# Patient Record
Sex: FEMALE | Race: ASIAN | NOT HISPANIC OR LATINO
[De-identification: names, ages, dates, MRNs, and addresses within clinical notes are randomized per-mention and may not be internally consistent; named-entity substitution may affect disease eponyms.]

---

## 2018-09-20 PROBLEM — Z00.00 ENCOUNTER FOR PREVENTIVE HEALTH EXAMINATION: Status: ACTIVE | Noted: 2018-09-20

## 2018-09-21 ENCOUNTER — APPOINTMENT (OUTPATIENT)
Dept: ORTHOPEDIC SURGERY | Facility: CLINIC | Age: 63
End: 2018-09-21
Payer: MEDICAID

## 2018-09-21 VITALS — SYSTOLIC BLOOD PRESSURE: 120 MMHG | DIASTOLIC BLOOD PRESSURE: 72 MMHG | HEART RATE: 76 BPM

## 2018-09-21 VITALS — HEIGHT: 62 IN | BODY MASS INDEX: 31.65 KG/M2 | WEIGHT: 172 LBS

## 2018-09-21 DIAGNOSIS — Z78.9 OTHER SPECIFIED HEALTH STATUS: ICD-10-CM

## 2018-09-21 DIAGNOSIS — Z56.0 UNEMPLOYMENT, UNSPECIFIED: ICD-10-CM

## 2018-09-21 DIAGNOSIS — M51.36 OTHER INTERVERTEBRAL DISC DEGENERATION, LUMBAR REGION: ICD-10-CM

## 2018-09-21 PROCEDURE — 99204 OFFICE O/P NEW MOD 45 MIN: CPT

## 2018-09-21 RX ORDER — LOSARTAN POTASSIUM 100 MG/1
100 TABLET, FILM COATED ORAL
Refills: 0 | Status: ACTIVE | COMMUNITY

## 2018-09-21 RX ORDER — TOPIRAMATE 25 MG/1
25 TABLET, FILM COATED ORAL
Refills: 0 | Status: ACTIVE | COMMUNITY

## 2018-09-21 RX ORDER — MELOXICAM 7.5 MG/1
7.5 TABLET ORAL
Refills: 0 | Status: ACTIVE | COMMUNITY

## 2018-09-21 RX ORDER — CALCIUM CARBONATE 500(1250)
500 TABLET ORAL
Refills: 0 | Status: ACTIVE | COMMUNITY

## 2018-09-21 SDOH — ECONOMIC STABILITY - INCOME SECURITY: UNEMPLOYMENT, UNSPECIFIED: Z56.0

## 2020-03-06 ENCOUNTER — EMERGENCY (EMERGENCY)
Facility: HOSPITAL | Age: 65
LOS: 1 days | Discharge: HOME | End: 2020-03-06
Attending: EMERGENCY MEDICINE | Admitting: EMERGENCY MEDICINE
Payer: MEDICARE

## 2020-03-06 VITALS
OXYGEN SATURATION: 98 % | HEART RATE: 66 BPM | RESPIRATION RATE: 20 BRPM | TEMPERATURE: 98 F | SYSTOLIC BLOOD PRESSURE: 143 MMHG | DIASTOLIC BLOOD PRESSURE: 64 MMHG

## 2020-03-06 VITALS
RESPIRATION RATE: 18 BRPM | OXYGEN SATURATION: 100 % | SYSTOLIC BLOOD PRESSURE: 137 MMHG | HEART RATE: 67 BPM | TEMPERATURE: 98 F | DIASTOLIC BLOOD PRESSURE: 78 MMHG

## 2020-03-06 LAB
ALBUMIN SERPL ELPH-MCNC: 4.3 G/DL — SIGNIFICANT CHANGE UP (ref 3.3–5)
ALP SERPL-CCNC: 65 U/L — SIGNIFICANT CHANGE UP (ref 40–120)
ALT FLD-CCNC: 38 U/L — HIGH (ref 4–33)
ANION GAP SERPL CALC-SCNC: 12 MMO/L — SIGNIFICANT CHANGE UP (ref 7–14)
AST SERPL-CCNC: 37 U/L — HIGH (ref 4–32)
BASOPHILS # BLD AUTO: 0.06 K/UL — SIGNIFICANT CHANGE UP (ref 0–0.2)
BASOPHILS NFR BLD AUTO: 0.7 % — SIGNIFICANT CHANGE UP (ref 0–2)
BILIRUB SERPL-MCNC: 0.3 MG/DL — SIGNIFICANT CHANGE UP (ref 0.2–1.2)
BUN SERPL-MCNC: 14 MG/DL — SIGNIFICANT CHANGE UP (ref 7–23)
CALCIUM SERPL-MCNC: 9.7 MG/DL — SIGNIFICANT CHANGE UP (ref 8.4–10.5)
CHLORIDE SERPL-SCNC: 102 MMOL/L — SIGNIFICANT CHANGE UP (ref 98–107)
CO2 SERPL-SCNC: 23 MMOL/L — SIGNIFICANT CHANGE UP (ref 22–31)
CREAT SERPL-MCNC: 0.64 MG/DL — SIGNIFICANT CHANGE UP (ref 0.5–1.3)
EOSINOPHIL # BLD AUTO: 0.32 K/UL — SIGNIFICANT CHANGE UP (ref 0–0.5)
EOSINOPHIL NFR BLD AUTO: 3.6 % — SIGNIFICANT CHANGE UP (ref 0–6)
GLUCOSE SERPL-MCNC: 96 MG/DL — SIGNIFICANT CHANGE UP (ref 70–99)
HCT VFR BLD CALC: 45.1 % — HIGH (ref 34.5–45)
HGB BLD-MCNC: 14.3 G/DL — SIGNIFICANT CHANGE UP (ref 11.5–15.5)
IMM GRANULOCYTES NFR BLD AUTO: 0.2 % — SIGNIFICANT CHANGE UP (ref 0–1.5)
LYMPHOCYTES # BLD AUTO: 3.3 K/UL — SIGNIFICANT CHANGE UP (ref 1–3.3)
LYMPHOCYTES # BLD AUTO: 37.2 % — SIGNIFICANT CHANGE UP (ref 13–44)
MAGNESIUM SERPL-MCNC: 2.4 MG/DL — SIGNIFICANT CHANGE UP (ref 1.6–2.6)
MCHC RBC-ENTMCNC: 29.7 PG — SIGNIFICANT CHANGE UP (ref 27–34)
MCHC RBC-ENTMCNC: 31.7 % — LOW (ref 32–36)
MCV RBC AUTO: 93.6 FL — SIGNIFICANT CHANGE UP (ref 80–100)
MONOCYTES # BLD AUTO: 0.73 K/UL — SIGNIFICANT CHANGE UP (ref 0–0.9)
MONOCYTES NFR BLD AUTO: 8.2 % — SIGNIFICANT CHANGE UP (ref 2–14)
NEUTROPHILS # BLD AUTO: 4.45 K/UL — SIGNIFICANT CHANGE UP (ref 1.8–7.4)
NEUTROPHILS NFR BLD AUTO: 50.1 % — SIGNIFICANT CHANGE UP (ref 43–77)
NRBC # FLD: 0 K/UL — SIGNIFICANT CHANGE UP (ref 0–0)
PLATELET # BLD AUTO: 344 K/UL — SIGNIFICANT CHANGE UP (ref 150–400)
PMV BLD: 10.8 FL — SIGNIFICANT CHANGE UP (ref 7–13)
POTASSIUM SERPL-MCNC: 5.4 MMOL/L — HIGH (ref 3.5–5.3)
POTASSIUM SERPL-SCNC: 5.4 MMOL/L — HIGH (ref 3.5–5.3)
PROT SERPL-MCNC: 7.5 G/DL — SIGNIFICANT CHANGE UP (ref 6–8.3)
RBC # BLD: 4.82 M/UL — SIGNIFICANT CHANGE UP (ref 3.8–5.2)
RBC # FLD: 14.4 % — SIGNIFICANT CHANGE UP (ref 10.3–14.5)
SODIUM SERPL-SCNC: 137 MMOL/L — SIGNIFICANT CHANGE UP (ref 135–145)
WBC # BLD: 8.88 K/UL — SIGNIFICANT CHANGE UP (ref 3.8–10.5)
WBC # FLD AUTO: 8.88 K/UL — SIGNIFICANT CHANGE UP (ref 3.8–10.5)

## 2020-03-06 PROCEDURE — 99053 MED SERV 10PM-8AM 24 HR FAC: CPT

## 2020-03-06 PROCEDURE — 99283 EMERGENCY DEPT VISIT LOW MDM: CPT

## 2020-03-06 RX ORDER — SODIUM CHLORIDE 9 MG/ML
1000 INJECTION INTRAMUSCULAR; INTRAVENOUS; SUBCUTANEOUS ONCE
Refills: 0 | Status: COMPLETED | OUTPATIENT
Start: 2020-03-06 | End: 2020-03-06

## 2020-03-06 RX ORDER — MECLIZINE HCL 12.5 MG
25 TABLET ORAL ONCE
Refills: 0 | Status: COMPLETED | OUTPATIENT
Start: 2020-03-06 | End: 2020-03-06

## 2020-03-06 RX ORDER — ACETAMINOPHEN 500 MG
650 TABLET ORAL ONCE
Refills: 0 | Status: COMPLETED | OUTPATIENT
Start: 2020-03-06 | End: 2020-03-06

## 2020-03-06 RX ADMIN — SODIUM CHLORIDE 1000 MILLILITER(S): 9 INJECTION INTRAMUSCULAR; INTRAVENOUS; SUBCUTANEOUS at 23:04

## 2020-03-06 RX ADMIN — Medication 650 MILLIGRAM(S): at 23:52

## 2020-03-06 RX ADMIN — Medication 25 MILLIGRAM(S): at 23:04

## 2020-03-06 NOTE — ED PROVIDER NOTE - CLINICAL SUMMARY MEDICAL DECISION MAKING FREE TEXT BOX
64F presenting with dizziness. previous h/o vertigo. symptoms worse with movement. non-focal neuro exam. low concern for CVA. likely benign vertigo. plan for labs, symptom control. will reassess.

## 2020-03-06 NOTE — ED ADULT NURSE REASSESSMENT NOTE - NS ED NURSE REASSESS COMMENT FT1
received report from radha CHANDLER. Pt is a/o x 3. pt states dizziness has improved.  no complaints of chest pain, headache, nausea, vomiting, SOB, fever, chills   verbalized. Pt has 20g iv placed to right AC with no redness or swelling noted. Awaiting further orders. Will continue to monitor.

## 2020-03-06 NOTE — ED PROVIDER NOTE - NSFOLLOWUPINSTRUCTIONS_ED_ALL_ED_FT
You were seen in the emergency department for dizziness.     Please follow-up with your primary care doctor in the next 24-48 hours.     Please follow-up with your ENT doctor in the next 1-2 weeks.     If you have any worsening symptoms, severe headache, changes in vision, slurring of speech or weakness or numbness of one side of your body, please return to the emergency department.

## 2020-03-06 NOTE — ED PROVIDER NOTE - ATTENDING CONTRIBUTION TO CARE
64F, pmh of htn, hld, presenting with dizziness. patient reports two days of intermittent dizziness (room spinning) worse with movement and turning her head. had similar symptoms previously and was seen by ENT. has some difficulty walking but no weakness or numbness on one side of her body. denies fever, chest pain, difficulty breathing, nausea, vomiting, abdominal pain, pain or swelling of lower extremities, pain or burning with urination. On exam: VSS lungs, heart, pulses, abdomen, extremities, skin, all normal on exam. Neuro Cranial nerves normal, EOMI, hearing normal, motor normal sensation normal, FTN and HTS normal, gait slightly unsteady as patient became vertigenous on standing. IMP: Positional vertigo. Plan: Trial of 64F, pmh of htn, hld, presenting with dizziness. patient reports two days of intermittent dizziness (room spinning) worse with movement and turning her head. had similar symptoms previously and was seen by ENT. has some difficulty walking but no weakness or numbness on one side of her body. denies fever, chest pain, difficulty breathing, nausea, vomiting, abdominal pain, pain or swelling of lower extremities, pain or burning with urination. Mild headache. On exam: VSS lungs, heart, pulses, abdomen, extremities, skin, all normal on exam. Neuro Cranial nerves normal, EOMI, hearing normal, motor normal sensation normal, FTN and HTS normal, gait slightly unsteady as patient became vertigenous on standing. IMP: Positional vertigo. Plan: Trial of meclizine check labs reassess

## 2020-03-06 NOTE — ED PROVIDER NOTE - PHYSICAL EXAMINATION
General: uncomfortable appearing female, no acute distress   HEENT: normocephalic, atraumatic, EOMI   Respiratory: normal work of breathing, lungs clear to auscultation bilaterally   Cardiac: regular rate and rhythm   Abdomen: soft, non-tender, no guarding or rebound   MSK: no swelling or tenderness of lower extremities, moving all extremities spontaneously   Skin: warm, dry   Neuro: A&Ox3, cranial nerves II-XII intact, normal finger to nose, normal heel to shin, normal alternating hands, no pronator drift, hesitant gait   Psych: appropriate affect

## 2020-03-06 NOTE — ED PROVIDER NOTE - PROGRESS NOTE DETAILS
patient reports symptoms improved but still present. will give additional meclizine. - resident Rivera Teague updated patient and family on results. patient reports she feels better and is ready for discharge. - resident Rivera Teague

## 2020-03-06 NOTE — ED PROVIDER NOTE - OBJECTIVE STATEMENT
64F, pmh of htn, hld, presenting with dizziness. patient reports two days of intermittent dizziness (room spinning) worse with movement and turning her head. had similar symptoms previously and was seen by ENT. has some difficulty walking but no weakness or numbness on one side of her body. denies fever, chest pain, difficulty breathing, nausea, vomiting, abdominal pain, pain or swelling of lower extremities, pain or burning with urination.

## 2020-03-06 NOTE — ED ADULT NURSE NOTE - OBJECTIVE STATEMENT
Received pt to spot 23 A&Ox4 ambulatory c/o dizziness intermittently since wednesday, states the room is spinning around her. Denies N/V, CP, blurry vision, headache. No acute distress noted. IV placed, labs sent, VS as documented, will continue to monitor.

## 2020-03-06 NOTE — ED PROVIDER NOTE - PATIENT PORTAL LINK FT
You can access the FollowMyHealth Patient Portal offered by [facility] by registering at the following website: [email or web address]. By joining [facility]’s FollowMyHealth portal, you will also be able to view your health information using other applications (apps) compatible with our system.

## 2020-03-06 NOTE — ED ADULT TRIAGE NOTE - CHIEF COMPLAINT QUOTE
"I started to have dizziness for a whole day on Wednesday. I still have dizziness on and off til today. Feels like the room is spinning. no nausea no vomiting" "I started to have dizziness for a whole day on Wednesday. I still have dizziness on and off til today. Feels like the room is spinning. no nausea no vomiting" pt ambulating with steady gait no disequilibrium

## 2020-03-06 NOTE — ED ADULT NURSE NOTE - CHIEF COMPLAINT QUOTE
"I started to have dizziness for a whole day on Wednesday. I still have dizziness on and off til today. Feels like the room is spinning. no nausea no vomiting" pt ambulating with steady gait no disequilibrium

## 2020-03-07 RX ORDER — MECLIZINE HCL 12.5 MG
25 TABLET ORAL ONCE
Refills: 0 | Status: COMPLETED | OUTPATIENT
Start: 2020-03-07 | End: 2020-03-07

## 2020-03-07 RX ORDER — MECLIZINE HCL 12.5 MG
1 TABLET ORAL
Qty: 8 | Refills: 0
Start: 2020-03-07 | End: 2020-03-08

## 2020-03-07 RX ADMIN — Medication 25 MILLIGRAM(S): at 00:43

## 2022-02-23 PROBLEM — E78.5 HYPERLIPIDEMIA, UNSPECIFIED: Chronic | Status: ACTIVE | Noted: 2020-03-06

## 2022-02-23 PROBLEM — I10 ESSENTIAL (PRIMARY) HYPERTENSION: Chronic | Status: ACTIVE | Noted: 2020-03-06

## 2022-07-25 ENCOUNTER — APPOINTMENT (OUTPATIENT)
Dept: PULMONOLOGY | Facility: CLINIC | Age: 67
End: 2022-07-25

## 2022-07-25 VITALS
BODY MASS INDEX: 32.43 KG/M2 | DIASTOLIC BLOOD PRESSURE: 67 MMHG | SYSTOLIC BLOOD PRESSURE: 117 MMHG | TEMPERATURE: 97.9 F | HEIGHT: 61.42 IN | HEART RATE: 77 BPM | OXYGEN SATURATION: 97 % | WEIGHT: 174 LBS

## 2022-07-25 DIAGNOSIS — R07.89 OTHER CHEST PAIN: ICD-10-CM

## 2022-07-25 DIAGNOSIS — R07.9 CHEST PAIN, UNSPECIFIED: ICD-10-CM

## 2022-07-25 PROCEDURE — 99203 OFFICE O/P NEW LOW 30 MIN: CPT

## 2022-07-25 RX ORDER — CHROMIUM 200 MCG
TABLET ORAL
Refills: 0 | Status: ACTIVE | COMMUNITY

## 2022-07-25 RX ORDER — MULTIVITAMIN
TABLET ORAL
Refills: 0 | Status: ACTIVE | COMMUNITY

## 2022-07-25 RX ORDER — PRAVASTATIN SODIUM 20 MG/1
20 TABLET ORAL
Refills: 0 | Status: DISCONTINUED | COMMUNITY
End: 2022-07-25

## 2022-07-25 RX ORDER — AMOXICILLIN 500 MG/1
500 CAPSULE ORAL
Refills: 0 | Status: DISCONTINUED | COMMUNITY
End: 2022-07-25

## 2022-07-25 RX ORDER — ASPIRIN 81 MG
81 TABLET, DELAYED RELEASE (ENTERIC COATED) ORAL
Refills: 0 | Status: ACTIVE | COMMUNITY

## 2022-07-25 RX ORDER — ATORVASTATIN CALCIUM 80 MG/1
80 TABLET, FILM COATED ORAL
Qty: 90 | Refills: 0 | Status: ACTIVE | COMMUNITY
Start: 2022-05-11

## 2022-07-25 NOTE — DISCUSSION/SUMMARY
[FreeTextEntry1] : The patient has left sided shoulder and lower neck pain and supra scapular area pain related to movement for more than 2 years.  The pain is not related to respiration.  It is likely that she has musculoskeletal issues around this area.  This was discussed with her.  She was recommended to try relaxation techniques and yoga to help improvement in movement and thus the pain.  She was advised to take NSAIDs if the pain is severe.\par Will do chest x-ray.\par The patient has clinical features consistent with sleep apnea.  Will do a home sleep study.

## 2022-07-25 NOTE — HISTORY OF PRESENT ILLNESS
[Never] : never [TextBox_4] : The patient is a 66-year-old lady with history of left upper back chest wall pain for the last several years.  She has no history of trauma.  She states that when she takes medications and physical therapy the pain improves.  But the improvement is short-lived.  She is a non-smoker.  There is no occupational history.  Her weight has been stable over the last 4 years.  The pain does not change with breathing.  However she has pain when she moves her left shoulder.  Extreme abduction and abduction of the shoulder causes significant pain.  She has pain in the left supra scapular area as well.  She states that the pain is worse when she wakes up in the morning.  She had x-rays of the area which were normal.\par She had history of osteoarthritis of the lumbar spine for which she was evaluated about 4 years back.  There is no history of dyspnea or cough.\par Her sleep is very poor.  She goes to bed at around 9 PM and wakes up at 11 PM and she cannot fall asleep till about 3 or 4 in the morning.  She sleeps about 4 to 5 hours a night.  She wakes up often with a dry mouth.  She does not know if she snores.  There is no history of witnessed apnea.  There is no history of excessive daytime somnolence.\par She has history of hypercholesterolemia and hypertension.\par There is no history of alcohol use.  She does not exercise much.  She walks daily.

## 2022-08-02 ENCOUNTER — APPOINTMENT (OUTPATIENT)
Dept: PULMONOLOGY | Facility: CLINIC | Age: 67
End: 2022-08-02

## 2022-08-02 PROCEDURE — 95806 SLEEP STUDY UNATT&RESP EFFT: CPT

## 2022-08-08 ENCOUNTER — OUTPATIENT (OUTPATIENT)
Dept: OUTPATIENT SERVICES | Facility: HOSPITAL | Age: 67
LOS: 1 days | End: 2022-08-08
Payer: MEDICARE

## 2022-08-08 ENCOUNTER — APPOINTMENT (OUTPATIENT)
Dept: RADIOLOGY | Facility: IMAGING CENTER | Age: 67
End: 2022-08-08

## 2022-08-08 DIAGNOSIS — R07.89 OTHER CHEST PAIN: ICD-10-CM

## 2022-08-08 PROCEDURE — 71046 X-RAY EXAM CHEST 2 VIEWS: CPT

## 2022-08-08 PROCEDURE — 71046 X-RAY EXAM CHEST 2 VIEWS: CPT | Mod: 26

## 2022-08-09 ENCOUNTER — NON-APPOINTMENT (OUTPATIENT)
Age: 67
End: 2022-08-09

## 2022-09-19 ENCOUNTER — APPOINTMENT (OUTPATIENT)
Dept: PULMONOLOGY | Facility: CLINIC | Age: 67
End: 2022-09-19

## 2022-09-19 VITALS
HEART RATE: 76 BPM | BODY MASS INDEX: 32.43 KG/M2 | HEIGHT: 61.42 IN | DIASTOLIC BLOOD PRESSURE: 83 MMHG | WEIGHT: 174 LBS | OXYGEN SATURATION: 99 % | SYSTOLIC BLOOD PRESSURE: 117 MMHG

## 2022-09-19 DIAGNOSIS — G47.33 OBSTRUCTIVE SLEEP APNEA (ADULT) (PEDIATRIC): ICD-10-CM

## 2022-09-19 PROCEDURE — 99213 OFFICE O/P EST LOW 20 MIN: CPT

## 2022-12-20 NOTE — DISCUSSION/SUMMARY
[FreeTextEntry1] : Patient who has diagnosed sleep apnea is waiting to get her PAP machine.  Details of the need for PAP machine was discussed with the patient.  She will return after she starts using the PAP machine.  She will continue to exercise and diet.

## 2022-12-20 NOTE — HISTORY OF PRESENT ILLNESS
[Never] : never [TextBox_4] : Patient with history of sleep apnea returns for follow-up.  Her body ache has improved with her exercises.  Since the last visit sleep apnea was diagnosed and PAP was ordered.  Her chest x-ray was done which was negative.\par The patient is waiting to get her PAP machine.  She has been exercising and walking daily.\par \par 7/25/22\par The patient is a 66-year-old lady with history of left upper back chest wall pain for the last several years.  She has no history of trauma.  She states that when she takes medications and physical therapy the pain improves.  But the improvement is short-lived.  She is a non-smoker.  There is no occupational history.  Her weight has been stable over the last 4 years.  The pain does not change with breathing.  However she has pain when she moves her left shoulder.  Extreme abduction and abduction of the shoulder causes significant pain.  She has pain in the left supra scapular area as well.  She states that the pain is worse when she wakes up in the morning.  She had x-rays of the area which were normal.\par She had history of osteoarthritis of the lumbar spine for which she was evaluated about 4 years back.  There is no history of dyspnea or cough.\par Her sleep is very poor.  She goes to bed at around 9 PM and wakes up at 11 PM and she cannot fall asleep till about 3 or 4 in the morning.  She sleeps about 4 to 5 hours a night.  She wakes up often with a dry mouth.  She does not know if she snores.  There is no history of witnessed apnea.  There is no history of excessive daytime somnolence.\par She has history of hypercholesterolemia and hypertension.\par There is no history of alcohol use.  She does not exercise much.  She walks daily.

## 2022-12-29 ENCOUNTER — APPOINTMENT (OUTPATIENT)
Dept: PULMONOLOGY | Facility: CLINIC | Age: 67
End: 2022-12-29

## 2023-07-27 ENCOUNTER — APPOINTMENT (OUTPATIENT)
Dept: ORTHOPEDIC SURGERY | Facility: CLINIC | Age: 68
End: 2023-07-27
Payer: MEDICARE

## 2023-07-27 VITALS — HEIGHT: 61 IN | WEIGHT: 180 LBS | BODY MASS INDEX: 33.99 KG/M2

## 2023-07-27 DIAGNOSIS — M25.561 PAIN IN RIGHT KNEE: ICD-10-CM

## 2023-07-27 DIAGNOSIS — M25.562 PAIN IN RIGHT KNEE: ICD-10-CM

## 2023-07-27 DIAGNOSIS — G89.29 PAIN IN RIGHT KNEE: ICD-10-CM

## 2023-07-27 PROCEDURE — 73562 X-RAY EXAM OF KNEE 3: CPT | Mod: 50

## 2023-07-27 PROCEDURE — 99205 OFFICE O/P NEW HI 60 MIN: CPT

## 2023-08-02 ENCOUNTER — OUTPATIENT (OUTPATIENT)
Dept: OUTPATIENT SERVICES | Facility: HOSPITAL | Age: 68
LOS: 1 days | End: 2023-08-02
Payer: MEDICARE

## 2023-08-02 ENCOUNTER — APPOINTMENT (OUTPATIENT)
Dept: CT IMAGING | Facility: IMAGING CENTER | Age: 68
End: 2023-08-02
Payer: MEDICARE

## 2023-08-02 DIAGNOSIS — M17.12 UNILATERAL PRIMARY OSTEOARTHRITIS, LEFT KNEE: ICD-10-CM

## 2023-08-02 PROCEDURE — 73700 CT LOWER EXTREMITY W/O DYE: CPT | Mod: 26,LT

## 2023-08-02 PROCEDURE — 73700 CT LOWER EXTREMITY W/O DYE: CPT

## 2023-08-08 ENCOUNTER — APPOINTMENT (OUTPATIENT)
Dept: ORTHOPEDIC SURGERY | Facility: CLINIC | Age: 68
End: 2023-08-08
Payer: MEDICARE

## 2023-08-08 VITALS — HEIGHT: 62 IN | WEIGHT: 180 LBS | BODY MASS INDEX: 33.13 KG/M2

## 2023-08-08 DIAGNOSIS — M17.0 BILATERAL PRIMARY OSTEOARTHRITIS OF KNEE: ICD-10-CM

## 2023-08-08 DIAGNOSIS — M17.12 UNILATERAL PRIMARY OSTEOARTHRITIS, LEFT KNEE: ICD-10-CM

## 2023-08-08 DIAGNOSIS — M25.551 PAIN IN RIGHT HIP: ICD-10-CM

## 2023-08-08 PROCEDURE — 99215 OFFICE O/P EST HI 40 MIN: CPT

## 2023-08-11 PROBLEM — M17.12 PRIMARY LOCALIZED OSTEOARTHRITIS OF LEFT KNEE: Status: ACTIVE | Noted: 2023-07-27

## 2023-08-11 PROBLEM — M17.0 PRIMARY LOCALIZED OSTEOARTHRITIS OF BOTH KNEES: Status: ACTIVE | Noted: 2023-07-27

## 2023-08-15 ENCOUNTER — TRANSCRIPTION ENCOUNTER (OUTPATIENT)
Age: 68
End: 2023-08-15

## 2023-08-24 ENCOUNTER — APPOINTMENT (OUTPATIENT)
Dept: OBGYN | Facility: CLINIC | Age: 68
End: 2023-08-24
Payer: MEDICARE

## 2023-08-24 VITALS
WEIGHT: 187 LBS | DIASTOLIC BLOOD PRESSURE: 69 MMHG | BODY MASS INDEX: 34.41 KG/M2 | SYSTOLIC BLOOD PRESSURE: 135 MMHG | HEIGHT: 62 IN

## 2023-08-24 DIAGNOSIS — Z80.0 FAMILY HISTORY OF MALIGNANT NEOPLASM OF DIGESTIVE ORGANS: ICD-10-CM

## 2023-08-24 DIAGNOSIS — Z01.419 ENCOUNTER FOR GYNECOLOGICAL EXAMINATION (GENERAL) (ROUTINE) W/OUT ABNORMAL FINDINGS: ICD-10-CM

## 2023-08-24 DIAGNOSIS — Z82.49 FAMILY HISTORY OF ISCHEMIC HEART DISEASE AND OTHER DISEASES OF THE CIRCULATORY SYSTEM: ICD-10-CM

## 2023-08-24 DIAGNOSIS — Z87.51 PERSONAL HISTORY OF PRE-TERM LABOR: ICD-10-CM

## 2023-08-24 PROCEDURE — 99387 INIT PM E/M NEW PAT 65+ YRS: CPT

## 2023-08-24 NOTE — PLAN
[FreeTextEntry1] : 67 year  y/o P8 presenting for annual exam -f/u pap, not sexually active since 2010 no GC/CT sent  -mammogram done 05/2023 wnl as per patient - colonoscopy wnl within the year  -DEXA 2 years ago WNL  -Contraception: none -f/u PRN

## 2023-08-24 NOTE — HISTORY OF PRESENT ILLNESS
[postmenopausal] : postmenopausal [N] : Patient is not sexually active [Y] : Positive pregnancy history [Previously active] : previously active [Men] : men [Vaginal] : vaginal [No] : No [Mammogramdate] : 05/2023 [TextBox_19] : WNL [TextBox_37] : 2 YEARS AGO RACHAEL  [ColonoscopyDate] : 02/2023 [TextBox_43] : WNL AS PER PATIENT [PGxTotal] : 8 [PGxPremature] : 8 [PGHxAbortions] : 0 [[facility] Hospital[facility]Living] : 0 [FreeTextEntry1] : NSVDx8 all were born at around 34 weeks

## 2023-08-24 NOTE — PHYSICAL EXAM
[Chaperone Present] : A chaperone was present in the examining room during all aspects of the physical examination [Appropriately responsive] : appropriately responsive [Alert] : alert [No Acute Distress] : no acute distress [No Lymphadenopathy] : no lymphadenopathy [Soft] : soft [Non-tender] : non-tender [Non-distended] : non-distended [No HSM] : No HSM [No Lesions] : no lesions [No Mass] : no mass [Oriented x3] : oriented x3 [Examination Of The Breasts] : a normal appearance [No Masses] : no breast masses were palpable [Labia Majora] : normal [Labia Minora] : normal [Normal] : normal [Uterine Adnexae] : normal [FreeTextEntry1] : [place]  [Dry Mucosa] : dry mucosa

## 2023-08-26 LAB
HPV 16 E6+E7 MRNA CVX QL NAA+PROBE: NOT DETECTED
HPV HIGH+LOW RISK DNA PNL CVX: NOT DETECTED
HPV18+45 E6+E7 MRNA CVX QL NAA+PROBE: NOT DETECTED

## 2023-08-28 ENCOUNTER — OUTPATIENT (OUTPATIENT)
Dept: OUTPATIENT SERVICES | Facility: HOSPITAL | Age: 68
LOS: 1 days | End: 2023-08-28
Payer: MEDICARE

## 2023-08-28 VITALS
OXYGEN SATURATION: 97 % | SYSTOLIC BLOOD PRESSURE: 121 MMHG | HEART RATE: 79 BPM | TEMPERATURE: 98 F | DIASTOLIC BLOOD PRESSURE: 64 MMHG | HEIGHT: 61.5 IN | RESPIRATION RATE: 15 BRPM | WEIGHT: 179.9 LBS

## 2023-08-28 DIAGNOSIS — M17.12 UNILATERAL PRIMARY OSTEOARTHRITIS, LEFT KNEE: ICD-10-CM

## 2023-08-28 DIAGNOSIS — M17.11 UNILATERAL PRIMARY OSTEOARTHRITIS, RIGHT KNEE: ICD-10-CM

## 2023-08-28 DIAGNOSIS — I10 ESSENTIAL (PRIMARY) HYPERTENSION: ICD-10-CM

## 2023-08-28 DIAGNOSIS — G47.33 OBSTRUCTIVE SLEEP APNEA (ADULT) (PEDIATRIC): ICD-10-CM

## 2023-08-28 DIAGNOSIS — Z92.241 PERSONAL HISTORY OF SYSTEMIC STEROID THERAPY: Chronic | ICD-10-CM

## 2023-08-28 DIAGNOSIS — Z98.890 OTHER SPECIFIED POSTPROCEDURAL STATES: Chronic | ICD-10-CM

## 2023-08-28 DIAGNOSIS — Z98.49 CATARACT EXTRACTION STATUS, UNSPECIFIED EYE: Chronic | ICD-10-CM

## 2023-08-28 LAB
A1C WITH ESTIMATED AVERAGE GLUCOSE RESULT: 6.2 % — HIGH (ref 4–5.6)
ANION GAP SERPL CALC-SCNC: 12 MMOL/L — SIGNIFICANT CHANGE UP (ref 7–14)
APPEARANCE UR: CLEAR — SIGNIFICANT CHANGE UP
BACTERIA # UR AUTO: NEGATIVE /HPF — SIGNIFICANT CHANGE UP
BILIRUB UR-MCNC: NEGATIVE — SIGNIFICANT CHANGE UP
BLD GP AB SCN SERPL QL: NEGATIVE — SIGNIFICANT CHANGE UP
BUN SERPL-MCNC: 10 MG/DL — SIGNIFICANT CHANGE UP (ref 7–23)
CALCIUM SERPL-MCNC: 9.5 MG/DL — SIGNIFICANT CHANGE UP (ref 8.4–10.5)
CAST: 0 /LPF — SIGNIFICANT CHANGE UP (ref 0–4)
CHLORIDE SERPL-SCNC: 103 MMOL/L — SIGNIFICANT CHANGE UP (ref 98–107)
CO2 SERPL-SCNC: 26 MMOL/L — SIGNIFICANT CHANGE UP (ref 22–31)
COLOR SPEC: YELLOW — SIGNIFICANT CHANGE UP
CREAT SERPL-MCNC: 0.61 MG/DL — SIGNIFICANT CHANGE UP (ref 0.5–1.3)
DIFF PNL FLD: NEGATIVE — SIGNIFICANT CHANGE UP
EGFR: 97 ML/MIN/1.73M2 — SIGNIFICANT CHANGE UP
ESTIMATED AVERAGE GLUCOSE: 131 — SIGNIFICANT CHANGE UP
GLUCOSE SERPL-MCNC: 101 MG/DL — HIGH (ref 70–99)
GLUCOSE UR QL: NEGATIVE MG/DL — SIGNIFICANT CHANGE UP
HCT VFR BLD CALC: 46.9 % — HIGH (ref 34.5–45)
HGB BLD-MCNC: 14.7 G/DL — SIGNIFICANT CHANGE UP (ref 11.5–15.5)
KETONES UR-MCNC: NEGATIVE MG/DL — SIGNIFICANT CHANGE UP
LEUKOCYTE ESTERASE UR-ACNC: ABNORMAL
MCHC RBC-ENTMCNC: 29.5 PG — SIGNIFICANT CHANGE UP (ref 27–34)
MCHC RBC-ENTMCNC: 31.3 GM/DL — LOW (ref 32–36)
MCV RBC AUTO: 94.2 FL — SIGNIFICANT CHANGE UP (ref 80–100)
NITRITE UR-MCNC: NEGATIVE — SIGNIFICANT CHANGE UP
NRBC # BLD: 0 /100 WBCS — SIGNIFICANT CHANGE UP (ref 0–0)
NRBC # FLD: 0 K/UL — SIGNIFICANT CHANGE UP (ref 0–0)
PH UR: 6 — SIGNIFICANT CHANGE UP (ref 5–8)
PLATELET # BLD AUTO: 332 K/UL — SIGNIFICANT CHANGE UP (ref 150–400)
POTASSIUM SERPL-MCNC: 3.7 MMOL/L — SIGNIFICANT CHANGE UP (ref 3.5–5.3)
POTASSIUM SERPL-SCNC: 3.7 MMOL/L — SIGNIFICANT CHANGE UP (ref 3.5–5.3)
PROT UR-MCNC: NEGATIVE MG/DL — SIGNIFICANT CHANGE UP
RBC # BLD: 4.98 M/UL — SIGNIFICANT CHANGE UP (ref 3.8–5.2)
RBC # FLD: 14.6 % — HIGH (ref 10.3–14.5)
RBC CASTS # UR COMP ASSIST: 0 /HPF — SIGNIFICANT CHANGE UP (ref 0–4)
RH IG SCN BLD-IMP: POSITIVE — SIGNIFICANT CHANGE UP
SODIUM SERPL-SCNC: 141 MMOL/L — SIGNIFICANT CHANGE UP (ref 135–145)
SP GR SPEC: 1.01 — SIGNIFICANT CHANGE UP (ref 1–1.03)
SQUAMOUS # UR AUTO: 0 /HPF — SIGNIFICANT CHANGE UP (ref 0–5)
UROBILINOGEN FLD QL: 0.2 MG/DL — SIGNIFICANT CHANGE UP (ref 0.2–1)
WBC # BLD: 7.78 K/UL — SIGNIFICANT CHANGE UP (ref 3.8–10.5)
WBC # FLD AUTO: 7.78 K/UL — SIGNIFICANT CHANGE UP (ref 3.8–10.5)
WBC UR QL: 1 /HPF — SIGNIFICANT CHANGE UP (ref 0–5)

## 2023-08-28 PROCEDURE — 93010 ELECTROCARDIOGRAM REPORT: CPT

## 2023-08-28 NOTE — H&P PST ADULT - HISTORY OF PRESENT ILLNESS
67 year old female with PMH of HTN, HLD,presents to PST, with pre op diagnosis of unilateral primary osteoarthritis left knee, for pre op evaluation prior to scheduled surgery- left total  knee replacement with JUAREZ Robot assist with Dr Casas.

## 2023-08-28 NOTE — H&P PST ADULT - NSICDXPASTMEDICALHX_GEN_ALL_CORE_FT
PAST MEDICAL HISTORY:  Dizziness     HLD (hyperlipidemia)     HTN (hypertension)     Osteoarthritis

## 2023-08-28 NOTE — H&P PST ADULT - FUNCTIONAL STATUS
can walk 1 blocks, home chores, can climb 1 flight of stairs- METs 4, patient has pain on b/l knees which  restricts to do exercises/4-10 METS

## 2023-08-28 NOTE — H&P PST ADULT - ADDITIONAL PE
denies loose teeth, Patient has partial upper dentures  soft palate isn't visible- class 4 Mallampati

## 2023-08-28 NOTE — H&P PST ADULT - REASON FOR ADMISSION
patient reports she is going for  left knee replacement ( PAF reports patient is scheduled for right total knee replacement.)

## 2023-08-28 NOTE — H&P PST ADULT - PROBLEM SELECTOR PLAN 1
Patient is tentatively scheduled surgery- left total  knee replacement with [employer] Robot assist with Dr Casas on 09/13/23.    Pre-op instructions provided. Pt given verbal and written instructions with teach back on chlorhexidine wash and patient's own omeprazole. Pt verbalized understanding with return demonstration.    CBC, BMP, A1C, T&S, UA, Urine culture, MRSA sent  Patient with MARV, HTN, HLD, going for high risk surgery- patient has appointment with pcp on 09/01/23 for pre op medical evaluation- will obtain copy of it. Patient is tentatively scheduled surgery- left total  knee replacement with IROCKE Robot assist with Dr Casas on 09/13/23.- Spoke to Michell surgical coordinator of Dr Casas- regarding the error in laterality of surgical site on Booking request report form.( documented as right knee replacement).     Pre-op instructions provided. Pt given verbal and written instructions with teach back on chlorhexidine wash and patient's own omeprazole. Pt verbalized understanding with return demonstration.    CBC, BMP, A1C, T&S, UA, Urine culture, MRSA sent  Patient with MARV, HTN, HLD, going for high risk surgery- patient has appointment with pcp on 09/01/23 for pre op medical evaluation- will obtain copy of it.

## 2023-08-28 NOTE — H&P PST ADULT - PROBLEM SELECTOR PLAN 2
Patient instructed to take losartan with a sip of water on the morning of procedure.   EKG in chart  Awaiting ECHO and stress test results

## 2023-08-28 NOTE — H&P PST ADULT - NSICDXPASTSURGICALHX_GEN_ALL_CORE_FT
PAST SURGICAL HISTORY:  H/O arthroscopy of knee     S/P cataract surgery     S/P epidural steroid injection

## 2023-08-29 ENCOUNTER — NON-APPOINTMENT (OUTPATIENT)
Age: 68
End: 2023-08-29

## 2023-08-29 LAB
CULTURE RESULTS: SIGNIFICANT CHANGE UP
CYTOLOGY CVX/VAG DOC THIN PREP: NORMAL
MRSA PCR RESULT.: SIGNIFICANT CHANGE UP
S AUREUS DNA NOSE QL NAA+PROBE: SIGNIFICANT CHANGE UP
SPECIMEN SOURCE: SIGNIFICANT CHANGE UP

## 2023-09-06 ENCOUNTER — APPOINTMENT (OUTPATIENT)
Dept: CT IMAGING | Facility: IMAGING CENTER | Age: 68
End: 2023-09-06

## 2023-09-06 PROBLEM — M19.90 UNSPECIFIED OSTEOARTHRITIS, UNSPECIFIED SITE: Chronic | Status: ACTIVE | Noted: 2023-08-28

## 2023-09-06 PROBLEM — R42 DIZZINESS AND GIDDINESS: Chronic | Status: ACTIVE | Noted: 2023-08-28

## 2023-09-07 ENCOUNTER — OUTPATIENT (OUTPATIENT)
Dept: OUTPATIENT SERVICES | Facility: HOSPITAL | Age: 68
LOS: 1 days | End: 2023-09-07
Payer: MEDICARE

## 2023-09-07 ENCOUNTER — APPOINTMENT (OUTPATIENT)
Dept: CT IMAGING | Facility: IMAGING CENTER | Age: 68
End: 2023-09-07
Payer: MEDICARE

## 2023-09-07 ENCOUNTER — RESULT REVIEW (OUTPATIENT)
Age: 68
End: 2023-09-07

## 2023-09-07 DIAGNOSIS — Z00.8 ENCOUNTER FOR OTHER GENERAL EXAMINATION: ICD-10-CM

## 2023-09-07 DIAGNOSIS — M17.11 UNILATERAL PRIMARY OSTEOARTHRITIS, RIGHT KNEE: ICD-10-CM

## 2023-09-07 DIAGNOSIS — Z98.49 CATARACT EXTRACTION STATUS, UNSPECIFIED EYE: Chronic | ICD-10-CM

## 2023-09-07 PROCEDURE — 73700 CT LOWER EXTREMITY W/O DYE: CPT | Mod: 26,RT

## 2023-09-07 PROCEDURE — 73700 CT LOWER EXTREMITY W/O DYE: CPT

## 2023-09-12 ENCOUNTER — NON-APPOINTMENT (OUTPATIENT)
Age: 68
End: 2023-09-12

## 2023-09-12 ENCOUNTER — TRANSCRIPTION ENCOUNTER (OUTPATIENT)
Age: 68
End: 2023-09-12

## 2023-09-12 NOTE — ASU PATIENT PROFILE, ADULT - FALL HARM RISK - HARM RISK INTERVENTIONS

## 2023-09-12 NOTE — ASU PATIENT PROFILE, ADULT - FALL HARM RISK - FALL HARM RISK
Admission Reconciliation is Completed  Discharge Reconciliation is Not Complete Admission Reconciliation is Completed  Discharge Reconciliation is Completed Surgery

## 2023-09-13 ENCOUNTER — INPATIENT (INPATIENT)
Facility: HOSPITAL | Age: 68
LOS: 0 days | Discharge: HOME HEALTH | End: 2023-09-14
Attending: ORTHOPAEDIC SURGERY | Admitting: ORTHOPAEDIC SURGERY
Payer: MEDICARE

## 2023-09-13 ENCOUNTER — TRANSCRIPTION ENCOUNTER (OUTPATIENT)
Age: 68
End: 2023-09-13

## 2023-09-13 ENCOUNTER — RESULT REVIEW (OUTPATIENT)
Age: 68
End: 2023-09-13

## 2023-09-13 ENCOUNTER — APPOINTMENT (OUTPATIENT)
Dept: ORTHOPEDIC SURGERY | Facility: HOSPITAL | Age: 68
End: 2023-09-13

## 2023-09-13 VITALS
SYSTOLIC BLOOD PRESSURE: 138 MMHG | TEMPERATURE: 98 F | HEIGHT: 61.5 IN | HEART RATE: 76 BPM | OXYGEN SATURATION: 97 % | WEIGHT: 179.9 LBS | RESPIRATION RATE: 16 BRPM | DIASTOLIC BLOOD PRESSURE: 77 MMHG

## 2023-09-13 DIAGNOSIS — Z98.49 CATARACT EXTRACTION STATUS, UNSPECIFIED EYE: Chronic | ICD-10-CM

## 2023-09-13 DIAGNOSIS — Z98.890 OTHER SPECIFIED POSTPROCEDURAL STATES: Chronic | ICD-10-CM

## 2023-09-13 DIAGNOSIS — M17.11 UNILATERAL PRIMARY OSTEOARTHRITIS, RIGHT KNEE: ICD-10-CM

## 2023-09-13 DIAGNOSIS — Z92.241 PERSONAL HISTORY OF SYSTEMIC STEROID THERAPY: Chronic | ICD-10-CM

## 2023-09-13 LAB
GLUCOSE BLDC GLUCOMTR-MCNC: 137 MG/DL — HIGH (ref 70–99)
GLUCOSE BLDC GLUCOMTR-MCNC: 140 MG/DL — HIGH (ref 70–99)
GLUCOSE BLDC GLUCOMTR-MCNC: 99 MG/DL — SIGNIFICANT CHANGE UP (ref 70–99)

## 2023-09-13 PROCEDURE — 27447 TOTAL KNEE ARTHROPLASTY: CPT | Mod: LT

## 2023-09-13 PROCEDURE — 0055T BONE SRGRY CMPTR CT/MRI IMAG: CPT

## 2023-09-13 PROCEDURE — 73560 X-RAY EXAM OF KNEE 1 OR 2: CPT | Mod: 26,LT

## 2023-09-13 PROCEDURE — S2900 ROBOTIC SURGICAL SYSTEM: CPT | Mod: NC

## 2023-09-13 PROCEDURE — 20985 CPTR-ASST DIR MS PX: CPT

## 2023-09-13 DEVICE — MAKO BONE PIN 4MM X 140MM: Type: IMPLANTABLE DEVICE | Site: LEFT | Status: FUNCTIONAL

## 2023-09-13 DEVICE — INSERT TIB BEARING CS X3 SZ 4 9MM: Type: IMPLANTABLE DEVICE | Site: LEFT | Status: FUNCTIONAL

## 2023-09-13 DEVICE — MAKO BONE PIN 4MM X 110MM: Type: IMPLANTABLE DEVICE | Site: LEFT | Status: FUNCTIONAL

## 2023-09-13 DEVICE — COMP FEM CR CMNTLSS BEADED W/ PA SZ 4 LT: Type: IMPLANTABLE DEVICE | Site: LEFT | Status: FUNCTIONAL

## 2023-09-13 DEVICE — BASEPLATE TIB TRIATHLON TRITAN SZ 4: Type: IMPLANTABLE DEVICE | Site: LEFT | Status: FUNCTIONAL

## 2023-09-13 DEVICE — PATELLA ASYMMETRIC TRIATHLON 29MM: Type: IMPLANTABLE DEVICE | Site: LEFT | Status: FUNCTIONAL

## 2023-09-13 RX ORDER — LOSARTAN POTASSIUM 100 MG/1
1 TABLET, FILM COATED ORAL
Refills: 0 | DISCHARGE

## 2023-09-13 RX ORDER — KETOROLAC TROMETHAMINE 30 MG/ML
15 SYRINGE (ML) INJECTION EVERY 6 HOURS
Refills: 0 | Status: DISCONTINUED | OUTPATIENT
Start: 2023-09-13 | End: 2023-09-14

## 2023-09-13 RX ORDER — OXYCODONE HYDROCHLORIDE 5 MG/1
10 TABLET ORAL
Refills: 0 | Status: DISCONTINUED | OUTPATIENT
Start: 2023-09-13 | End: 2023-09-14

## 2023-09-13 RX ORDER — MECLIZINE HCL 12.5 MG
25 TABLET ORAL
Refills: 0 | Status: DISCONTINUED | OUTPATIENT
Start: 2023-09-13 | End: 2023-09-14

## 2023-09-13 RX ORDER — TRAMADOL HYDROCHLORIDE 50 MG/1
50 TABLET ORAL EVERY 6 HOURS
Refills: 0 | Status: DISCONTINUED | OUTPATIENT
Start: 2023-09-13 | End: 2023-09-14

## 2023-09-13 RX ORDER — ACETAMINOPHEN 500 MG
975 TABLET ORAL EVERY 8 HOURS
Refills: 0 | Status: DISCONTINUED | OUTPATIENT
Start: 2023-09-13 | End: 2023-09-14

## 2023-09-13 RX ORDER — CEFAZOLIN SODIUM 1 G
2000 VIAL (EA) INJECTION EVERY 8 HOURS
Refills: 0 | Status: COMPLETED | OUTPATIENT
Start: 2023-09-13 | End: 2023-09-14

## 2023-09-13 RX ORDER — PANTOPRAZOLE SODIUM 20 MG/1
40 TABLET, DELAYED RELEASE ORAL ONCE
Refills: 0 | Status: COMPLETED | OUTPATIENT
Start: 2023-09-13 | End: 2023-09-13

## 2023-09-13 RX ORDER — ASPIRIN/CALCIUM CARB/MAGNESIUM 324 MG
325 TABLET ORAL DAILY
Refills: 0 | Status: DISCONTINUED | OUTPATIENT
Start: 2023-09-13 | End: 2023-09-14

## 2023-09-13 RX ORDER — SODIUM CHLORIDE 9 MG/ML
1000 INJECTION, SOLUTION INTRAVENOUS
Refills: 0 | Status: DISCONTINUED | OUTPATIENT
Start: 2023-09-13 | End: 2023-09-14

## 2023-09-13 RX ORDER — MAGNESIUM HYDROXIDE 400 MG/1
30 TABLET, CHEWABLE ORAL DAILY
Refills: 0 | Status: DISCONTINUED | OUTPATIENT
Start: 2023-09-13 | End: 2023-09-14

## 2023-09-13 RX ORDER — OXYCODONE HYDROCHLORIDE 5 MG/1
5 TABLET ORAL ONCE
Refills: 0 | Status: DISCONTINUED | OUTPATIENT
Start: 2023-09-13 | End: 2023-09-13

## 2023-09-13 RX ORDER — TRAMADOL HYDROCHLORIDE 50 MG/1
50 TABLET ORAL ONCE
Refills: 0 | Status: DISCONTINUED | OUTPATIENT
Start: 2023-09-13 | End: 2023-09-13

## 2023-09-13 RX ORDER — ONDANSETRON 8 MG/1
4 TABLET, FILM COATED ORAL EVERY 6 HOURS
Refills: 0 | Status: DISCONTINUED | OUTPATIENT
Start: 2023-09-13 | End: 2023-09-14

## 2023-09-13 RX ORDER — CHLORHEXIDINE GLUCONATE 213 G/1000ML
1 SOLUTION TOPICAL ONCE
Refills: 0 | Status: COMPLETED | OUTPATIENT
Start: 2023-09-13 | End: 2023-09-13

## 2023-09-13 RX ORDER — CELECOXIB 200 MG/1
200 CAPSULE ORAL EVERY 12 HOURS
Refills: 0 | Status: DISCONTINUED | OUTPATIENT
Start: 2023-09-14 | End: 2023-09-14

## 2023-09-13 RX ORDER — CYCLOSPORINE 0.5 MG/ML
1 EMULSION OPHTHALMIC
Refills: 0 | DISCHARGE

## 2023-09-13 RX ORDER — SODIUM CHLORIDE 9 MG/ML
500 INJECTION, SOLUTION INTRAVENOUS ONCE
Refills: 0 | Status: COMPLETED | OUTPATIENT
Start: 2023-09-13 | End: 2023-09-13

## 2023-09-13 RX ORDER — HYDROMORPHONE HYDROCHLORIDE 2 MG/ML
1 INJECTION INTRAMUSCULAR; INTRAVENOUS; SUBCUTANEOUS
Refills: 0 | Status: DISCONTINUED | OUTPATIENT
Start: 2023-09-13 | End: 2023-09-13

## 2023-09-13 RX ORDER — DEXAMETHASONE 0.5 MG/5ML
8 ELIXIR ORAL ONCE
Refills: 0 | Status: COMPLETED | OUTPATIENT
Start: 2023-09-14 | End: 2023-09-14

## 2023-09-13 RX ORDER — POLYETHYLENE GLYCOL 3350 17 G/17G
17 POWDER, FOR SOLUTION ORAL AT BEDTIME
Refills: 0 | Status: DISCONTINUED | OUTPATIENT
Start: 2023-09-13 | End: 2023-09-14

## 2023-09-13 RX ORDER — SENNA PLUS 8.6 MG/1
2 TABLET ORAL AT BEDTIME
Refills: 0 | Status: DISCONTINUED | OUTPATIENT
Start: 2023-09-13 | End: 2023-09-14

## 2023-09-13 RX ORDER — SODIUM CHLORIDE 9 MG/ML
500 INJECTION, SOLUTION INTRAVENOUS ONCE
Refills: 0 | Status: COMPLETED | OUTPATIENT
Start: 2023-09-13 | End: 2023-09-14

## 2023-09-13 RX ORDER — HYDROCHLOROTHIAZIDE 25 MG
1 TABLET ORAL
Refills: 0 | DISCHARGE

## 2023-09-13 RX ORDER — MECLIZINE HCL 12.5 MG
1 TABLET ORAL
Refills: 0 | DISCHARGE

## 2023-09-13 RX ORDER — FOLIC ACID 0.8 MG
1 TABLET ORAL
Refills: 0 | DISCHARGE

## 2023-09-13 RX ORDER — HYDROMORPHONE HYDROCHLORIDE 2 MG/ML
0.5 INJECTION INTRAMUSCULAR; INTRAVENOUS; SUBCUTANEOUS ONCE
Refills: 0 | Status: DISCONTINUED | OUTPATIENT
Start: 2023-09-13 | End: 2023-09-14

## 2023-09-13 RX ORDER — OXYCODONE HYDROCHLORIDE 5 MG/1
5 TABLET ORAL
Refills: 0 | Status: DISCONTINUED | OUTPATIENT
Start: 2023-09-13 | End: 2023-09-14

## 2023-09-13 RX ORDER — HYDROMORPHONE HYDROCHLORIDE 2 MG/ML
0.5 INJECTION INTRAMUSCULAR; INTRAVENOUS; SUBCUTANEOUS
Refills: 0 | Status: DISCONTINUED | OUTPATIENT
Start: 2023-09-13 | End: 2023-09-13

## 2023-09-13 RX ORDER — LOSARTAN POTASSIUM 100 MG/1
100 TABLET, FILM COATED ORAL DAILY
Refills: 0 | Status: DISCONTINUED | OUTPATIENT
Start: 2023-09-13 | End: 2023-09-14

## 2023-09-13 RX ORDER — ACETAMINOPHEN 500 MG
1000 TABLET ORAL ONCE
Refills: 0 | Status: COMPLETED | OUTPATIENT
Start: 2023-09-13 | End: 2023-09-13

## 2023-09-13 RX ORDER — ONDANSETRON 8 MG/1
4 TABLET, FILM COATED ORAL ONCE
Refills: 0 | Status: DISCONTINUED | OUTPATIENT
Start: 2023-09-13 | End: 2023-09-13

## 2023-09-13 RX ORDER — ASCORBIC ACID 60 MG
1 TABLET,CHEWABLE ORAL
Refills: 0 | DISCHARGE

## 2023-09-13 RX ORDER — ACETAMINOPHEN 500 MG
1000 TABLET ORAL ONCE
Refills: 0 | Status: COMPLETED | OUTPATIENT
Start: 2023-09-14 | End: 2023-09-14

## 2023-09-13 RX ADMIN — Medication 15 MILLIGRAM(S): at 21:25

## 2023-09-13 RX ADMIN — OXYCODONE HYDROCHLORIDE 5 MILLIGRAM(S): 5 TABLET ORAL at 17:00

## 2023-09-13 RX ADMIN — PANTOPRAZOLE SODIUM 40 MILLIGRAM(S): 20 TABLET, DELAYED RELEASE ORAL at 11:22

## 2023-09-13 RX ADMIN — Medication 100 MILLIGRAM(S): at 22:00

## 2023-09-13 RX ADMIN — SODIUM CHLORIDE 30 MILLILITER(S): 9 INJECTION, SOLUTION INTRAVENOUS at 11:20

## 2023-09-13 RX ADMIN — CHLORHEXIDINE GLUCONATE 1 APPLICATION(S): 213 SOLUTION TOPICAL at 11:19

## 2023-09-13 RX ADMIN — POLYETHYLENE GLYCOL 3350 17 GRAM(S): 17 POWDER, FOR SOLUTION ORAL at 21:26

## 2023-09-13 RX ADMIN — Medication 25 MILLIGRAM(S): at 21:26

## 2023-09-13 RX ADMIN — OXYCODONE HYDROCHLORIDE 5 MILLIGRAM(S): 5 TABLET ORAL at 16:07

## 2023-09-13 RX ADMIN — TRAMADOL HYDROCHLORIDE 50 MILLIGRAM(S): 50 TABLET ORAL at 11:22

## 2023-09-13 RX ADMIN — SODIUM CHLORIDE 500 MILLILITER(S): 9 INJECTION, SOLUTION INTRAVENOUS at 15:57

## 2023-09-13 RX ADMIN — Medication 975 MILLIGRAM(S): at 21:26

## 2023-09-13 NOTE — DISCHARGE NOTE PROVIDER - NSDCMRMEDTOKEN_GEN_ALL_CORE_FT
Artificial Tears ophthalmic solution: 1 in each eye 2 times a day  aspirin 81 mg oral tablet: 1 orally once a day (in the morning)  calcium 600 1 cap oral daily:   folic acid 1 mg oral tablet: 1 orally once a day  hydroCHLOROthiazide 12.5 mg oral tablet: 1 orally once a day (in the morning)  losartan 100 mg oral tablet: 1 orally once a day (in the morning)  meclizine 25 mg oral tablet: 1 orally as needed for vertigo  omeprazole 40 mg oral delayed release capsule: 1 orally once a day  Refresh Optive Ganesh-3 ophthalmic solution: 1 in each eye 2 times a day  Restasis 0.05% ophthalmic emulsion: 1 in each affected eye 2 times a day PRN  Tab-A-ar 1 tab oral daily last dose 8/28/23:   Vitamin C 500 mg oral capsule: 1 orally once a day  Vitamin D3 2000 iu daily:    acetaminophen 325 mg oral tablet: 3 tab(s) orally every 8 hours MDD: 9  Artificial Tears ophthalmic solution: 1 in each eye 2 times a day  aspirin 325 mg oral delayed release tablet: 1 tab(s) orally once a day MDD: 2  calcium 600 1 cap oral daily:   folic acid 1 mg oral tablet: 1 orally once a day  hydroCHLOROthiazide 12.5 mg oral tablet: 1 orally once a day (in the morning)  losartan 100 mg oral tablet: 1 orally once a day (in the morning)  meclizine 25 mg oral tablet: 1 orally as needed for vertigo  Narcan 4 mg/0.1 mL nasal spray: 4 milligram(s) intranasally once MDD: 1  oxyCODONE 5 mg oral tablet: 1 tab(s) orally every 3 hours as needed for Moderate Pain (4 - 6) MDD: 2  Protonix 40 mg oral delayed release tablet: 1 tab(s) orally once a day MDD: 1  Refresh Optive Ganesh-3 ophthalmic solution: 1 in each eye 2 times a day  Restasis 0.05% ophthalmic emulsion: 1 in each affected eye 2 times a day PRN  senna leaf extract oral tablet: 2 tab(s) orally once a day (at bedtime) MDD: 2  Tab-A-ar 1 tab oral daily last dose 8/28/23:   traMADol 50 mg oral tablet: 1 tab(s) orally every 8 hours as needed for Mild Pain (1 - 3) MDD: 3  Vitamin C 500 mg oral capsule: 1 orally once a day  Vitamin D3 2000 iu daily:

## 2023-09-13 NOTE — PHYSICAL THERAPY INITIAL EVALUATION ADULT - ACTIVE RANGE OF MOTION EXAMINATION, REHAB EVAL
except left knee active ROM 0-60 degrees/bilateral upper extremity Active ROM was WFL (within functional limits)/bilateral  lower extremity Active ROM was WFL (within functional limits)

## 2023-09-13 NOTE — DISCHARGE NOTE PROVIDER - NSDCFUSCHEDAPPT_GEN_ALL_CORE_FT
Dominguez Casas  [facility]  ORTHOSURG [address] [address] [facility]  Scheduled Appointment: 09/27/2023

## 2023-09-13 NOTE — OCCUPATIONAL THERAPY INITIAL EVALUATION ADULT - PERTINENT HX OF CURRENT PROBLEM, REHAB EVAL
67 year old female with PMH of HTN, HLD ,presents to PST, with pre op diagnosis of unilateral primary osteoarthritis left knee, for pre op evaluation prior to scheduled surgery left total  knee replacement with JUAREZ Robot on 9/13/23,

## 2023-09-13 NOTE — PHYSICAL THERAPY INITIAL EVALUATION ADULT - ADDITIONAL COMMENTS
Pt. reports owning a straight cane, rolling walker, and shower chair.    Pt. was left in bed post PT Evaluation, no apparent distress, all lines intact, call lamb within reach. Sheryl CHANDLER made aware of pt. status.

## 2023-09-13 NOTE — OCCUPATIONAL THERAPY INITIAL EVALUATION ADULT - NSOTDISCHREC_GEN_A_CORE
To be determined. Patient dizzy during evaluation and unable to participate in functional activities.

## 2023-09-13 NOTE — PATIENT PROFILE ADULT - FALL HARM RISK - RISK INTERVENTIONS
Assistance OOB with selected safe patient handling equipment/Assistance with ambulation/Communicate Fall Risk and Risk Factors to all staff, patient, and family/Discuss with provider need for PT consult/Monitor gait and stability/Provide patient with walking aids - walker, cane, crutches/Reinforce activity limits and safety measures with patient and family/Sit up slowly, dangle for a short time, stand at bedside before walking/Use of alarms - bed, chair and/or voice tab/Visual Cue: Yellow wristband/Bed in lowest position, wheels locked, appropriate side rails in place/Call bell, personal items and telephone in reach/Instruct patient to call for assistance before getting out of bed or chair/Non-slip footwear when patient is out of bed/[place] to call system/Physically safe environment - no spills, clutter or unnecessary equipment/Purposeful Proactive Rounding/Room/bathroom lighting operational, light cord in reach

## 2023-09-13 NOTE — DISCHARGE NOTE PROVIDER - CARE PROVIDERS DIRECT ADDRESSES
[email or web address]

## 2023-09-13 NOTE — BRIEF OPERATIVE NOTE - NSICDXBRIEFPREOP_GEN_ALL_CORE_FT
PRE-OP DIAGNOSIS:  Osteoarthritis of left knee 13-Sep-2023 18:07:04  Kate Ng  
PRE-OP DIAGNOSIS:  Osteoarthritis of right knee 13-Sep-2023 17:41:58  Kate Ng

## 2023-09-13 NOTE — OCCUPATIONAL THERAPY INITIAL EVALUATION ADULT - ADDITIONAL COMMENTS
Patient lives in a private home with 5 steps to enter. Her bedroom is in the basement and there are 9 steps. She lives with her family and has a walk in shower with a shower chair. She was independent in all ADLs. She has a walker and cane at home, but did not use them yet.

## 2023-09-13 NOTE — PHYSICAL THERAPY INITIAL EVALUATION ADULT - REFERRING PHYSICIAN, REHAB EVAL
Alisha Romano called to request a refill on her medication. Last office visit : 5/22/2023   Next office visit : 8/23/2023     Last UDS:   Amphetamine Screen, Urine   Date Value Ref Range Status   02/20/2023 N  Final     Barbiturate Screen, Urine   Date Value Ref Range Status   02/20/2023 POSITIVE  Final     Benzodiazepine Screen, Urine   Date Value Ref Range Status   02/20/2023 N  Final     Buprenorphine Urine   Date Value Ref Range Status   02/20/2023 N  Final     Cocaine Metabolite Screen, Urine   Date Value Ref Range Status   02/20/2023 N  Final     Gabapentin Screen, Urine   Date Value Ref Range Status   02/20/2023 N  Final     MDMA, Urine   Date Value Ref Range Status   02/20/2023 N  Final     Methamphetamine, Urine   Date Value Ref Range Status   02/20/2023 N  Final     Opiate Scrn, Ur   Date Value Ref Range Status   02/20/2023 N  Final     Oxycodone Screen, Ur   Date Value Ref Range Status   02/20/2023 N  Final     PCP Screen, Urine   Date Value Ref Range Status   02/20/2023 N  Final     Propoxyphene Screen, Urine   Date Value Ref Range Status   02/20/2023 N  Final     THC Screen, Urine   Date Value Ref Range Status   02/20/2023 N  Final     Tricyclic Antidepressants, Urine   Date Value Ref Range Status   02/20/2023 N  Final       Last Ankit Diaz: 7-28-23  Medication Contract: 2-20-23   Last Fill: 7-1-23    Requested Prescriptions     Pending Prescriptions Disp Refills    traMADol (ULTRAM) 50 MG tablet [Pharmacy Med Name: traMADol HCl 50 MG Oral Tablet] 45 tablet 0     Sig: TAKE 1 TABLET BY MOUTH EVERY 12 HOURS AS NEEDED FOR PAIN FOR  UP  TO  30  DAYS         Please approve or refuse this medication.    Sarahi Mclaughlin MA Kate Ng

## 2023-09-13 NOTE — OCCUPATIONAL THERAPY INITIAL EVALUATION ADULT - GENERAL OBSERVATIONS, REHAB EVAL
Patient received semisupine in bed in NAD. +IV. /78 mmHg. Patient agreed to participate in OT evalaution.

## 2023-09-13 NOTE — DISCHARGE NOTE PROVIDER - CARE PROVIDER_API CALL
Dominguez Casas  Orthopaedic Surgery  [address], [address]  [place] [address]  Phone: [phone]  Fax: [phone]  Established Patient  Follow Up Time:

## 2023-09-13 NOTE — DISCHARGE NOTE PROVIDER - HOSPITAL COURSE
67 Pt is s/p  left TKA  without any intraoperative complications.  Pt is doing well and stable for discharge.  Pt is tolerating physical therapy: WBAT with cane/walker if needed, gait training.  Leave dressing on until post op office visit (POD#14). Have sutures/staples removed POD#14 if present.  Pt is on enteric coated ASA 325mg PO BID for DVT prophylaxis, take for 6 weeks unless otherwise instructed by surgeon.  follow up with Dr. Casas  in two weeks. Follow up with primary care doctor in 1-2 weeks for continuity of care.  The patient had no post-operative complications and clinically progressed faster than anticipated.   The patient met criteria for discharge before the 2nd night of stay. The patient was safely and appropriately discharged home.

## 2023-09-14 ENCOUNTER — TRANSCRIPTION ENCOUNTER (OUTPATIENT)
Age: 68
End: 2023-09-14

## 2023-09-14 VITALS
DIASTOLIC BLOOD PRESSURE: 57 MMHG | TEMPERATURE: 98 F | OXYGEN SATURATION: 99 % | RESPIRATION RATE: 17 BRPM | SYSTOLIC BLOOD PRESSURE: 123 MMHG | HEART RATE: 70 BPM

## 2023-09-14 DIAGNOSIS — E78.5 HYPERLIPIDEMIA, UNSPECIFIED: ICD-10-CM

## 2023-09-14 DIAGNOSIS — Z86.69 PERSONAL HISTORY OF OTHER DISEASES OF THE NERVOUS SYSTEM AND SENSE ORGANS: ICD-10-CM

## 2023-09-14 DIAGNOSIS — I10 ESSENTIAL (PRIMARY) HYPERTENSION: ICD-10-CM

## 2023-09-14 DIAGNOSIS — M19.90 UNSPECIFIED OSTEOARTHRITIS, UNSPECIFIED SITE: ICD-10-CM

## 2023-09-14 DIAGNOSIS — Z29.9 ENCOUNTER FOR PROPHYLACTIC MEASURES, UNSPECIFIED: ICD-10-CM

## 2023-09-14 LAB
ANION GAP SERPL CALC-SCNC: 15 MMOL/L — HIGH (ref 7–14)
BUN SERPL-MCNC: 10 MG/DL — SIGNIFICANT CHANGE UP (ref 7–23)
CALCIUM SERPL-MCNC: 9.8 MG/DL — SIGNIFICANT CHANGE UP (ref 8.4–10.5)
CHLORIDE SERPL-SCNC: 98 MMOL/L — SIGNIFICANT CHANGE UP (ref 98–107)
CO2 SERPL-SCNC: 24 MMOL/L — SIGNIFICANT CHANGE UP (ref 22–31)
CREAT SERPL-MCNC: 0.62 MG/DL — SIGNIFICANT CHANGE UP (ref 0.5–1.3)
EGFR: 96 ML/MIN/1.73M2 — SIGNIFICANT CHANGE UP
GLUCOSE SERPL-MCNC: 145 MG/DL — HIGH (ref 70–99)
HCT VFR BLD CALC: 43.8 % — SIGNIFICANT CHANGE UP (ref 34.5–45)
HGB BLD-MCNC: 14.1 G/DL — SIGNIFICANT CHANGE UP (ref 11.5–15.5)
MCHC RBC-ENTMCNC: 29.4 PG — SIGNIFICANT CHANGE UP (ref 27–34)
MCHC RBC-ENTMCNC: 32.2 GM/DL — SIGNIFICANT CHANGE UP (ref 32–36)
MCV RBC AUTO: 91.3 FL — SIGNIFICANT CHANGE UP (ref 80–100)
NRBC # BLD: 0 /100 WBCS — SIGNIFICANT CHANGE UP (ref 0–0)
NRBC # FLD: 0 K/UL — SIGNIFICANT CHANGE UP (ref 0–0)
PLATELET # BLD AUTO: 321 K/UL — SIGNIFICANT CHANGE UP (ref 150–400)
POTASSIUM SERPL-MCNC: 4.9 MMOL/L — SIGNIFICANT CHANGE UP (ref 3.5–5.3)
POTASSIUM SERPL-SCNC: 4.9 MMOL/L — SIGNIFICANT CHANGE UP (ref 3.5–5.3)
RBC # BLD: 4.8 M/UL — SIGNIFICANT CHANGE UP (ref 3.8–5.2)
RBC # FLD: 14.7 % — HIGH (ref 10.3–14.5)
SODIUM SERPL-SCNC: 137 MMOL/L — SIGNIFICANT CHANGE UP (ref 135–145)
WBC # BLD: 14.14 K/UL — HIGH (ref 3.8–10.5)
WBC # FLD AUTO: 14.14 K/UL — HIGH (ref 3.8–10.5)

## 2023-09-14 PROCEDURE — 99223 1ST HOSP IP/OBS HIGH 75: CPT

## 2023-09-14 RX ORDER — OXYCODONE HYDROCHLORIDE 5 MG/1
1 TABLET ORAL
Qty: 42 | Refills: 0
Start: 2023-09-14 | End: 2023-09-20

## 2023-09-14 RX ORDER — TRAMADOL HYDROCHLORIDE 50 MG/1
1 TABLET ORAL
Qty: 21 | Refills: 0
Start: 2023-09-14 | End: 2023-09-20

## 2023-09-14 RX ORDER — ASPIRIN/CALCIUM CARB/MAGNESIUM 324 MG
1 TABLET ORAL
Qty: 42 | Refills: 0
Start: 2023-09-14 | End: 2023-10-25

## 2023-09-14 RX ORDER — NALOXONE HYDROCHLORIDE 4 MG/.1ML
4 SPRAY NASAL
Qty: 1 | Refills: 0
Start: 2023-09-14 | End: 2023-09-14

## 2023-09-14 RX ORDER — PANTOPRAZOLE SODIUM 20 MG/1
1 TABLET, DELAYED RELEASE ORAL
Qty: 30 | Refills: 0
Start: 2023-09-14 | End: 2023-10-13

## 2023-09-14 RX ORDER — ACETAMINOPHEN 500 MG
3 TABLET ORAL
Qty: 63 | Refills: 0
Start: 2023-09-14 | End: 2023-09-20

## 2023-09-14 RX ORDER — SENNA PLUS 8.6 MG/1
2 TABLET ORAL
Qty: 28 | Refills: 0
Start: 2023-09-14 | End: 2023-09-27

## 2023-09-14 RX ORDER — OMEPRAZOLE 10 MG/1
1 CAPSULE, DELAYED RELEASE ORAL
Refills: 0 | DISCHARGE

## 2023-09-14 RX ORDER — PANTOPRAZOLE SODIUM 20 MG/1
40 TABLET, DELAYED RELEASE ORAL DAILY
Refills: 0 | Status: DISCONTINUED | OUTPATIENT
Start: 2023-09-14 | End: 2023-09-14

## 2023-09-14 RX ORDER — ASPIRIN/CALCIUM CARB/MAGNESIUM 324 MG
1 TABLET ORAL
Refills: 0 | DISCHARGE

## 2023-09-14 RX ADMIN — Medication 975 MILLIGRAM(S): at 07:02

## 2023-09-14 RX ADMIN — OXYCODONE HYDROCHLORIDE 10 MILLIGRAM(S): 5 TABLET ORAL at 09:15

## 2023-09-14 RX ADMIN — Medication 15 MILLIGRAM(S): at 14:14

## 2023-09-14 RX ADMIN — LOSARTAN POTASSIUM 100 MILLIGRAM(S): 100 TABLET, FILM COATED ORAL at 07:02

## 2023-09-14 RX ADMIN — CELECOXIB 200 MILLIGRAM(S): 200 CAPSULE ORAL at 18:34

## 2023-09-14 RX ADMIN — Medication 101.6 MILLIGRAM(S): at 08:04

## 2023-09-14 RX ADMIN — Medication 15 MILLIGRAM(S): at 14:44

## 2023-09-14 RX ADMIN — OXYCODONE HYDROCHLORIDE 10 MILLIGRAM(S): 5 TABLET ORAL at 02:50

## 2023-09-14 RX ADMIN — Medication 975 MILLIGRAM(S): at 14:13

## 2023-09-14 RX ADMIN — CELECOXIB 200 MILLIGRAM(S): 200 CAPSULE ORAL at 19:34

## 2023-09-14 RX ADMIN — ONDANSETRON 4 MILLIGRAM(S): 8 TABLET, FILM COATED ORAL at 10:32

## 2023-09-14 RX ADMIN — OXYCODONE HYDROCHLORIDE 10 MILLIGRAM(S): 5 TABLET ORAL at 10:45

## 2023-09-14 RX ADMIN — Medication 100 MILLIGRAM(S): at 07:01

## 2023-09-14 RX ADMIN — Medication 15 MILLIGRAM(S): at 08:04

## 2023-09-14 RX ADMIN — Medication 15 MILLIGRAM(S): at 02:52

## 2023-09-14 RX ADMIN — Medication 975 MILLIGRAM(S): at 15:13

## 2023-09-14 RX ADMIN — Medication 25 MILLIGRAM(S): at 09:25

## 2023-09-14 RX ADMIN — Medication 15 MILLIGRAM(S): at 03:45

## 2023-09-14 NOTE — CONSULT NOTE ADULT - SUBJECTIVE AND OBJECTIVE BOX
Medicine Progress Note    Patient is a 67y old  Female who presents with a chief complaint of left Sudarshan TKA  (13 Sep 2023 16:51)      SUBJECTIVE / OVERNIGHT EVENTS: no events     :    MEDICATIONS  (STANDING):  acetaminophen     Tablet .. 975 milliGRAM(s) Oral every 8 hours  aspirin enteric coated 325 milliGRAM(s) Oral daily  celecoxib 200 milliGRAM(s) Oral every 12 hours  hydrochlorothiazide 12.5 milliGRAM(s) Oral daily  HYDROmorphone  Injectable 0.5 milliGRAM(s) IV Push once  lactated ringers. 1000 milliLiter(s) (30 mL/Hr) IV Continuous <Continuous>  losartan 100 milliGRAM(s) Oral daily  polyethylene glycol 3350 17 Gram(s) Oral at bedtime  senna 2 Tablet(s) Oral at bedtime    MEDICATIONS  (PRN):  magnesium hydroxide Suspension 30 milliLiter(s) Oral daily PRN Constipation  meclizine 25 milliGRAM(s) Oral two times a day PRN vertigo  ondansetron Injectable 4 milliGRAM(s) IV Push every 6 hours PRN Nausea and/or Vomiting  oxyCODONE    IR 5 milliGRAM(s) Oral every 3 hours PRN Moderate Pain (4 - 6)  oxyCODONE    IR 10 milliGRAM(s) Oral every 3 hours PRN Severe Pain (7 - 10)  traMADol 50 milliGRAM(s) Oral every 6 hours PRN Mild Pain (1 - 3)    CAPILLARY BLOOD GLUCOSE      POCT Blood Glucose.: 140 mg/dL (13 Sep 2023 22:18)  POCT Blood Glucose.: 137 mg/dL (13 Sep 2023 20:04)    I&O's Summary    13 Sep 2023 07:01  -  14 Sep 2023 07:00  --------------------------------------------------------  IN: 600 mL / OUT: 600 mL / NET: 0 mL    14 Sep 2023 07:01  -  14 Sep 2023 14:25  --------------------------------------------------------  IN: 0 mL / OUT: 500 mL / NET: -500 mL        PHYSICAL EXAM:  Vital Signs Last 24 Hrs  T(C): 36.7 (14 Sep 2023 14:24), Max: 36.7 (14 Sep 2023 14:24)  T(F): 98.1 (14 Sep 2023 14:24), Max: 98.1 (14 Sep 2023 14:24)  HR: 71 (14 Sep 2023 14:24) (61 - 80)  BP: 142/62 (14 Sep 2023 14:24) (130/64 - 166/96)  BP(mean): 105 (13 Sep 2023 17:15) (93 - 118)  RR: 17 (14 Sep 2023 14:24) (10 - 20)  SpO2: 100% (14 Sep 2023 14:24) (96% - 100%)    Parameters below as of 14 Sep 2023 14:24  Patient On (Oxygen Delivery Method): room air      CONSTITUTIONAL: NAD,   ENMT: Moist oral mucosa, no pharyngeal injection or exudates;   RESPIRATORY: Normal respiratory effort; lungs are clear to auscultation bilaterally  CARDIOVASCULAR: Regular rate and rhythm, normal S1 and S2, no murmur/; No lower extremity edema; Left knee brace   ABDOMEN: Nontender to palpation, normoactive bowel sounds, no rebound/guarding;   PSYCH: A+O to person, place, and time; affect appropriate  NEUROLOGY: CN 2-12 are intact and symmetric; no gross sensory deficits   SKIN: No rashes; no palpable lesions    LABS:                        14.1   14.14 )-----------( 321      ( 14 Sep 2023 05:29 )             43.8     09-14    137  |  98  |  10  ----------------------------<  145<H>  4.9   |  24  |  0.62    Ca    9.8      14 Sep 2023 05:29            Urinalysis Basic - ( 14 Sep 2023 05:29 )    Color: x / Appearance: x / SG: x / pH: x  Gluc: 145 mg/dL / Ketone: x  / Bili: x / Urobili: x   Blood: x / Protein: x / Nitrite: x   Leuk Esterase: x / RBC: x / WBC x   Sq Epi: x / Non Sq Epi: x / Bacteria: x            RADIOLOGY & ADDITIONAL TESTS:  Imaging from Last 24 Hours:    Electrocardiogram/QTc Interval:    COORDINATION OF CARE:  Care Discussed with Consultants/Other Providers: Ortho

## 2023-09-14 NOTE — DISCHARGE NOTE NURSING/CASE MANAGEMENT/SOCIAL WORK - PATIENT PORTAL LINK FT
You can access the FollowMyHealth Patient Portal offered by [facility] by registering at the following website: [email or web address]. By joining [facility]’s FollowMyHealth portal, you will also be able to view your health information using other applications (apps) compatible with our system.

## 2023-09-14 NOTE — CONSULT NOTE ADULT - ASSESSMENT
67 year old female with PMH of HTN, HLD,presents to [facility], with pre op diagnosis of unilateral primary osteoarthritis left knee, for pre op evaluation prior to scheduled surgery- left total  knee replacement with JUARE[employer] Robot assist with Dr Casas.  Now Patient is s/p   L TKA DOS 9/13/2023.

## 2023-09-14 NOTE — CONSULT NOTE ADULT - PROBLEM SELECTOR PROBLEM 1
Assessment    1  Acute pharyngitis, unspecified etiology (462) (J02 9)    Discussion/Summary  Discussion Summary:   Rapid strep was negative  We will send a throat culture for further testing  Likely viral  Symptomatic treatment is appropriate  Can use ibuprofen or Tylenol as needed for pain  Use throat lozenges, salt water gargle cell with symptoms  If not improving over the next 5-7 days, follow up with PCP  Follow Up Instructions: Follow Up with your Primary Care Provider in 7 days  If your symptoms worsen, go to the nearest [place] [id number] Emergency Department  Chief Complaint    1  Sore Throat  Chief Complaint Free Text Note Form: c/o sore throat for 2 days, painful swallowing ; concerned if strep  History of Present Illness  HPI: 75-year-old female here with complaint of sore throat for the last 2 days  Has had painful swallowing  She is concerned if she has strep or not  Denies any fever chills  Has had a slight cough  Hospital Based Practices Required Assessment:  Pain Assessment  the patient states they have pain  The pain is located in the throat  (on a scale of 0 to 10, the patient rates the pain at 4 )  Abuse And Domestic Violence Screen   Yes, the patient is safe at home  -- The patient states no one is hurting them  Depression And Suicide Screen  No, the patient has not had thoughts of hurting themself  Sore Throat: Rajiv Conroy presents with complaints of sore throat  Associated symptoms include cough, but-- no nasal congestion,-- no postnasal drainage,-- no swollen glands,-- no myalgias,-- no drooling,-- no stridor,-- no fever,-- no chills,-- no headache,-- no hoarseness,-- no neck stiffness,-- no ear pain,-- no abdominal pain,-- no nausea-- and-- no vomiting  Review of Systems  Focused-Female:  Constitutional: no fever,-- not feeling poorly,-- no chills-- and-- not feeling tired  ENT: sore throat, but-- as noted in HPI,-- no earache-- and-- no nasal discharge  Cardiovascular: no complaints of slow or fast heart rate, no chest pain, no palpitations, no leg claudication or lower extremity edema  Respiratory: cough, but-- as noted in HPI,-- no shortness of breath-- and-- no wheezing  ROS Reviewed:   ROS reviewed  Active Problems    1  Acute pharyngitis, unspecified etiology (462) (J02 9)   2  Anxiety (300 00) (F41 9)   3  Bacterial vaginosis (616 10,041 9) (N76 0,B96 89)   4  Candidiasis (112 9) (B37 9)   5  Contraceptives (V25 02)   6  Cough (786 2) (R05)   7  Dyspareunia (625 0)   8  Eczema (692 9) (L30 9)   9  Encounter for routine pelvic examination (V72 31) (Z01 419)   10  Encounter for screening for malignant neoplasm of cervix (V76 2) (Z12 4)   11  Exposure to STD (V01 6) (Z20 2)   12  Menorrhagia (626 2) (N92 0)   13  Methicillin resistant Staphylococcus aureus infection (041 12) (A49 02)   14  Preeclampsia (642 40) (O14 90)   15  Sore throat (462) (J02 9)   16  Vaginitis (616 10) (N76 0)   17  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)   18  Vulvar Pain (625 8)    Past Medical History  1  History of Blood Transfusion Hemolysis (999 83)   2  History of Chlamydia trachomatis infection of lower genitourinary site (099 53) (A56 09)   3  History Of 1  Previous Pregnancies (V61 5)   4  History of acute sinusitis (V12 69) (Z87 09)   5  History of condyloma acuminatum (V12 09) (Z86 19)   6  History of human papillomavirus infection (V12 09) (Z86 19)  Active Problems And Past Medical History Reviewed: The active problems and past medical history were reviewed and updated today  Family History  Father    1  Family history of Hypertension (V17 49)  Paternal Grandmother    2  Family history of Breast Cancer (V16 3)  Paternal Grandfather    3  Family history of Diabetes Mellitus (V18 0)  Family History Reviewed: The family history was reviewed and updated today         Social History   · Denied: History of Alcohol Use (History)   · Daily Coffee Consumption (1  Cups/Day)   · Denied: History of Drug Use   · Never A Smoker  Social History Reviewed: The social history was reviewed and updated today  The social history was reviewed and is unchanged  Surgical History    1  History of Breast Surgery Enlargement Procedure   2  History of  Section Low Transverse   3  Contraceptives (V25 02)   4  History of Pap Smear (+) Low Grade Squamous Intraepithelial Lesion (795 03)  Surgical History Reviewed: The surgical history was reviewed and updated today  Current Meds   1  Amoxicillin-Pot Clavulanate 500-125 MG Oral Tablet; TAKE 1 TABLET EVERY 12 HOURS DAILY; Therapy: [id number] to (Kalyan Aguilar)  Requested for: [id number]; Last Rx:2015 Ordered  Medication List Reviewed: The medication list was reviewed and updated today  Allergies    1  No Known Drug Allergies    Vitals  Signs   Recorded: [id number] 10:36AM   Temperature: 96 5 F  Heart Rate: 67  Respiration: 18  Systolic: 872  Diastolic: 78  Weight: 607 lb   BMI Calculated: 20 53  BSA Calculated: 1 73  O2 Saturation: 100  Pain Scale: 4    Physical Exam   Constitutional  General appearance: No acute distress, well appearing and well nourished  Ears, Nose, Mouth, and Throat  External inspection of ears and nose: Normal    Otoscopic examination: Tympanic membranes translucent with normal light reflex  Canals patent without erythema  Nasal mucosa, septum, and turbinates: Normal without edema or erythema  Oropharynx: Abnormal   The posterior pharynx was erythematous, but-- did not have an exudate  Pulmonary  Respiratory effort: No increased work of breathing or signs of respiratory distress  Auscultation of lungs: Clear to auscultation  Cardiovascular  Auscultation of heart: Normal rate and rhythm, normal S1 and S2, without murmurs         Results/Data  Rapid [place] Cord- POC [id number] 10:43AM Phoebe Nine     Test Name Result Flag Reference   Rapid Strep Negative           Signatures   Electronically signed by : Chaparro Archibald, [facility]; Dec  1 2017 11:06AM EST                       (Author)    Electronically signed by : RACHNA Young ; Dec  1 2017  6:21PM EST                       (Co-author) Osteoarthritis

## 2023-09-14 NOTE — PROGRESS NOTE ADULT - ASSESSMENT
A/P  Patient S/P L TKA DOS 9/13/2023.  VSS. NAD.  PT/OT-WBAT  IS  DVT PPx: aspirin 325 BID  Pain Control  Dispo planning: home once cleared by PT

## 2023-09-14 NOTE — DISCHARGE NOTE NURSING/CASE MANAGEMENT/SOCIAL WORK - NSDCPEFALRISK_GEN_ALL_CORE
For information on Fall & Injury Prevention, visit: [email or web address] OR  [email or web address] OR  https://www.cdc.gov/steadi/patient.html

## 2023-09-14 NOTE — DISCHARGE NOTE NURSING/CASE MANAGEMENT/SOCIAL WORK - NSSCTYPOFSERV_GEN_ALL_CORE
Nurse and physical therapist anticipated start of care is the day after hospital discharge; the above home care agency will contact you to arrange time of initial visits.

## 2023-09-14 NOTE — DISCHARGE NOTE NURSING/CASE MANAGEMENT/SOCIAL WORK - NSDCPECAREGIVERED_GEN_ALL_CORE
knee replacement  exercise worksheet  caring for my incision  pain management  cold compression therapy

## 2023-09-14 NOTE — PROGRESS NOTE ADULT - SUBJECTIVE AND OBJECTIVE BOX
ORTHO POC      Patient resting comfortably without complaint s/p left TKA today     Vital Signs Last 24 Hrs  T(C): 36.6 (13 Sep 2023 16:30), Max: 36.7 (13 Sep 2023 10:25)  T(F): 97.8 (13 Sep 2023 16:30), Max: 98 (13 Sep 2023 10:25)  HR: 74 (13 Sep 2023 16:45) (67 - 80)  BP: 165/86 (13 Sep 2023 16:45) (130/80 - 166/96)  BP(mean): 110 (13 Sep 2023 16:45) (93 - 118)  RR: 14 (13 Sep 2023 16:45) (10 - 20)  SpO2: 100% (13 Sep 2023 16:45) (96% - 100%)    Parameters below as of 13 Sep 2023 16:00  Patient On (Oxygen Delivery Method): room air        left knee: dressing clean/dry/ intact   Ice wrap in place           LE:  EHL/GC/TA 5/5                  sensory intact                   DP pulse 2+    Labs : AM                U/O:  DTV    A/P: Stable postop            PT- WBAT            DVT prophylaxis- venodynes/ ASA BID           Pain Control            Incentive Spirometer            Antibiotics x 24 hr            Follow up AM labs
ANESTHESIA POSTOP CHECK    67y Female POSTOP DAY 1     Vital Signs Last 24 Hrs  T(C): 36.4 (14 Sep 2023 06:57), Max: 36.7 (13 Sep 2023 10:25)  T(F): 97.6 (14 Sep 2023 06:57), Max: 98 (13 Sep 2023 10:25)  HR: 61 (14 Sep 2023 06:57) (61 - 80)  BP: 136/67 (14 Sep 2023 06:57) (130/80 - 166/96)  BP(mean): 105 (13 Sep 2023 17:15) (93 - 118)  RR: 18 (14 Sep 2023 06:57) (10 - 20)  SpO2: 100% (14 Sep 2023 06:57) (96% - 100%)    Parameters below as of 14 Sep 2023 06:57  Patient On (Oxygen Delivery Method): room air            [ x] NO APPARENT ANESTHESIA COMPLICATIONS      
Orthopedic Progress Note     S:  No acute events overnight, pain is well controlled.  Patient denies any chest pain, SOB, N/V, fevers/chills. Patient would like to go home later today after working with PT.     T(C): 36.3 (09-14-23 @ 01:36), Max: 36.7 (09-13-23 @ 10:25)  HR: 70 (09-14-23 @ 01:36) (61 - 80)  BP: 154/79 (09-14-23 @ 01:36) (130/80 - 166/96)  RR: 17 (09-14-23 @ 01:36) (10 - 20)  SpO2: 100% (09-14-23 @ 01:36) (96% - 100%)  Wt(kg): --I&O's Summary    13 Sep 2023 07:01  -  14 Sep 2023 06:39  --------------------------------------------------------  IN: 600 mL / OUT: 600 mL / NET: 0 mL        O:  Physical exam:  Gen: Alert and Oriented x3, No Acute Distress   Left lower ext:            Dressing: c/d/i            Motor: EHL FHL GA TA SOL in tact            Sensation: SILT            Pulses: 2+ DP

## 2023-09-14 NOTE — DISCHARGE NOTE NURSING/CASE MANAGEMENT/SOCIAL WORK - NSDCPNINST_GEN_ALL_CORE
You have a post op appointment with Dr. Casas on September 27, 2023 @ 3PM. If you are unable to keep this appointment, please call the office to reschedule. Call MD if you develop a fever, or if there is redness, swelling, drainage or pain not relieved by pain medication. No heavy lifting, bending, or straining to move your bowels. Take over the counter stool softeners as needed to prevent constipation which may be caused by pain medication.

## 2023-09-27 ENCOUNTER — APPOINTMENT (OUTPATIENT)
Dept: ORTHOPEDIC SURGERY | Facility: CLINIC | Age: 68
End: 2023-09-27
Payer: MEDICARE

## 2023-09-27 PROCEDURE — 73562 X-RAY EXAM OF KNEE 3: CPT | Mod: LT

## 2023-09-27 PROCEDURE — 99024 POSTOP FOLLOW-UP VISIT: CPT

## 2023-12-27 ENCOUNTER — APPOINTMENT (OUTPATIENT)
Dept: ORTHOPEDIC SURGERY | Facility: CLINIC | Age: 68
End: 2023-12-27
Payer: MEDICARE

## 2023-12-27 VITALS — BODY MASS INDEX: 34.04 KG/M2 | HEIGHT: 62 IN | WEIGHT: 185 LBS

## 2023-12-27 DIAGNOSIS — Z96.652 PRESENCE OF LEFT ARTIFICIAL KNEE JOINT: ICD-10-CM

## 2023-12-27 DIAGNOSIS — M17.11 UNILATERAL PRIMARY OSTEOARTHRITIS, RIGHT KNEE: ICD-10-CM

## 2023-12-27 PROCEDURE — 99214 OFFICE O/P EST MOD 30 MIN: CPT

## 2023-12-27 PROCEDURE — 73562 X-RAY EXAM OF KNEE 3: CPT | Mod: LT

## 2024-10-07 NOTE — BRIEF OPERATIVE NOTE - NSICDXBRIEFPROCEDURE_GEN_ALL_CORE_FT
Ambulatory Visit  Name: Jeannie Hoover      : 1960      MRN: [id number]  Encounter Provider: Flor Mckeon DO  Encounter Date: 10/7/2024   Encounter department: [facility]    Assessment & Plan  Type 2 diabetes mellitus without complication, without long-term current use of insulin (HCC)  Diabetes is well-controlled  Continue metformin 500 mg daily and Ozempic 0.5 mg weekly  Lab Results   Component Value Date    HGBA1C 6.6 (H) 10/03/2024       Orders:    metFORMIN (GLUCOPHAGE) 500 mg tablet; Take 1 tablet (500 mg total) by mouth daily with breakfast    semaglutide, 0.25 or 0.5 mg/dose, (Ozempic, 0.25 or 0.5 MG/DOSE,) 2 mg/3 mL injection pen; Inject 0.75 mL (0.5 mg total) under the skin every 7 days 0.25 mg under the skin every 7 days Code: E11.9    Hemoglobin A1C; Future    Albumin / creatinine urine ratio; Future    Hemoglobin A1C    Albumin / creatinine urine ratio    IRIS Diabetic eye exam    Benign hypertension  Well-controlled  Continue losartan 50 mg daily and amlodipine 5 mg daily  Orders:    amLODIPine (NORVASC) 5 mg tablet; Take 1 tablet (5 mg total) by mouth daily    losartan (COZAAR) 50 mg tablet; Take 1 tablet (50 mg total) by mouth daily    CBC; Future    Comprehensive metabolic panel; Future    Lipid Panel with Direct LDL reflex; Future    CBC    Comprehensive metabolic panel    Lipid Panel with Direct LDL reflex    Mixed hyperlipidemia  Stable  Continue atorvastatin 10 mg daily  Orders:    atorvastatin (LIPITOR) 10 mg tablet; Take 1 tablet (10 mg total) by mouth daily    Comprehensive metabolic panel; Future    Lipid Panel with Direct LDL reflex; Future    Comprehensive metabolic panel    Lipid Panel with Direct LDL reflex    Adenocarcinoma of lung, stage 4, right (HCC)  Needs follow-up CT scan  Will get it done in January and will follow-up with surgeon  Orders:    CT chest wo contrast; Future    Wrinkles    Orders:    tretinoin (RETIN-A) 0.1 % cream; Apply topically daily at 
"bedtime    Viral upper respiratory tract infection  COVID test negative  Supportive measures recommended  Orders:    Poct Covid 19 Rapid Antigen Test       Return in about 6 months (around 2025) for Annual Wellness Visit.    History of Present Illness     She was at  on on the .  Then woke up not feeling well on the . She is coughing a lot.  Was also around her brother who has bronchitis.           Review of Systems        Objective     /82   Pulse 100   Temp 98.6 °F (37 °C)   Resp 17   Ht 5' 6\" (1.676 m)   Wt 85.9 kg (189 lb 6.4 oz)   LMP  (LMP Unknown)   BMI 30.57 kg/m²     Physical Exam  Vitals and nursing note reviewed.   Constitutional:       Appearance: She is well-developed.   HENT:      Head: Normocephalic and atraumatic.      Right Ear: External ear normal.      Left Ear: External ear normal.      Nose: Nose normal.   Cardiovascular:      Rate and Rhythm: Normal rate and regular rhythm.      Pulses: no weak pulses.           Dorsalis pedis pulses are 2+ on the right side and 2+ on the left side.        Posterior tibial pulses are 2+ on the right side and 2+ on the left side.      Heart sounds: Normal heart sounds. No murmur heard.     No friction rub.   Pulmonary:      Effort: No respiratory distress.      Breath sounds: Normal breath sounds. No wheezing or rales.   Feet:      Right foot:      Skin integrity: No ulcer, skin breakdown, erythema, warmth, callus or dry skin.      Left foot:      Skin integrity: No ulcer, skin breakdown, erythema, warmth, callus or dry skin.     Patient's shoes and socks removed.    Right Foot/Ankle   Right Foot Inspection  Skin Exam: skin normal. Skin not intact, no dry skin, no warmth, no callus, no erythema, no maceration, no abnormal color, no pre-ulcer, no ulcer and no callus.     Toe Exam: ROM and strength within normal limits.     Sensory   Monofilament testing: intact    Vascular  Capillary refills: < 3 seconds  The right DP pulse is 2+. "
The right PT pulse is 2+.     Left Foot/Ankle  Left Foot Inspection  Skin Exam: skin normal. Skin not intact, no dry skin, no warmth, no erythema, no maceration, normal color, no pre-ulcer, no ulcer and no callus.     Toe Exam: ROM and strength within normal limits.     Sensory   Monofilament testing: intact    Vascular  Capillary refills: < 3 seconds  The left DP pulse is 2+. The left PT pulse is 2+.     Assign Risk Category  No deformity present  No loss of protective sensation  No weak pulses  Risk: 0      
PROCEDURES:  Left total knee arthroplasty 13-Sep-2023 18:06:52  Kate Ng  
PROCEDURES:  Right total knee arthroplasty 13-Sep-2023 17:41:46  Kate Ng

## 2024-11-04 NOTE — PHYSICAL THERAPY INITIAL EVALUATION ADULT - LEVEL OF INDEPENDENCE: GAIT, REHAB EVAL
Need medical clearance prior to procedure        Outpatient surgery : excision of melanoma in-situ right anterior scalp with possible FTSG  Surgery has been scheduled at [address] on 11/21/2024, Pre-Admission Testing will call you prior to surgery to inform you arrival time and any other additional directions,if they are unable to reach you,please call them two days prior at [phone].  If taking Fish Oil, Vitamins, two weeks prior to surgery stop taking. If taking NSAIDS (such as Aspirin, Ibuprofen) anticoagulants please consult with your prescribing physician to get further instructions on when to stop medication prior to surgery that is scheduled, patient understood.         to be assessed

## 2024-12-10 ENCOUNTER — APPOINTMENT (OUTPATIENT)
Dept: ORTHOPEDIC SURGERY | Facility: CLINIC | Age: 69
End: 2024-12-10

## 2024-12-12 ENCOUNTER — APPOINTMENT (OUTPATIENT)
Dept: ORTHOPEDIC SURGERY | Facility: CLINIC | Age: 69
End: 2024-12-12
Payer: MEDICARE

## 2024-12-12 VITALS — BODY MASS INDEX: 33.13 KG/M2 | HEIGHT: 62 IN | WEIGHT: 180 LBS

## 2024-12-12 DIAGNOSIS — Z96.652 PRESENCE OF LEFT ARTIFICIAL KNEE JOINT: ICD-10-CM

## 2024-12-12 PROCEDURE — 73562 X-RAY EXAM OF KNEE 3: CPT | Mod: LT

## 2024-12-12 PROCEDURE — 99214 OFFICE O/P EST MOD 30 MIN: CPT

## (undated) DEVICE — DRAPE IOBAN 23" X 23"

## (undated) DEVICE — FEMORAL PREP SINGLE USE SIZE 4

## (undated) DEVICE — POSITIONER STRAP ARMBOARD VELCRO TS-30

## (undated) DEVICE — POSITIONER CARDIAC BUMP

## (undated) DEVICE — DRSG STOCKINETTE IMPERVIOUS XL

## (undated) DEVICE — MAKO DRAPE KIT

## (undated) DEVICE — TAPE SILK 3"

## (undated) DEVICE — GOWN XXL

## (undated) DEVICE — GLV 8 PROTEXIS (CREAM) MICRO

## (undated) DEVICE — SYR LUER LOK 20CC

## (undated) DEVICE — SAW BLADE STRYKER SAGITTAL 3 HOLE OSCILLATING

## (undated) DEVICE — DRSG COBAN 4"

## (undated) DEVICE — ELCTR GROUNDING PAD ADULT COVIDIEN

## (undated) DEVICE — DRAPE IOBAN 33" X 23"

## (undated) DEVICE — SUT VICRYL 2-0 27" PS-2 UNDYED

## (undated) DEVICE — SAW BLADE STRYKER SAGITTAL DUAL CUT 64X35X.89MM

## (undated) DEVICE — PREP CHLORAPREP HI-LITE ORANGE 26ML

## (undated) DEVICE — TOURNIQUET CUFF 34" DUAL PORT W PLC

## (undated) DEVICE — LAP PAD W RING 18 X 18"

## (undated) DEVICE — DRSG KLING 4"

## (undated) DEVICE — SOL IRR POUR H2O 1500ML

## (undated) DEVICE — SUCTION YANKAUER NO CONTROL VENT

## (undated) DEVICE — DRAPE LARGE SHEET 72X85"

## (undated) DEVICE — DRSG COBAN 6"

## (undated) DEVICE — LABELS BLANK W PEN

## (undated) DEVICE — GOWN SMARTGOWN RAGLAN XLG

## (undated) DEVICE — TUBING SUCTION NONCONDUCTIVE 6MM X 12FT

## (undated) DEVICE — WOUND IRR IRRISEPT W 0.5 CHG

## (undated) DEVICE — CANISTER DISPOSABLE THIN WALL 3000CC

## (undated) DEVICE — ELCTR PLASMA BLADE X 3.0S WIDE TIP

## (undated) DEVICE — MAKO VIZADISC KNEE TRACKING KIT

## (undated) DEVICE — SUT VICRYL 0 27" OS-6 UNDYED

## (undated) DEVICE — MAKO BLADE STANDARD

## (undated) DEVICE — PACK LIJ BASIC ORTHO

## (undated) DEVICE — DRAPE 3/4 SHEET 52X76"

## (undated) DEVICE — DRSG DERMABOND 0.7ML

## (undated) DEVICE — DRSG AQUACEL 3.5 X 12"

## (undated) DEVICE — WARMING BLANKET FULL ADULT

## (undated) DEVICE — DRAPE EXTREMITY 87" X 106" X 128"

## (undated) DEVICE — SUT PDO 2 1/2 CIRCLE 40MM NDL 45CM

## (undated) DEVICE — HOOD T5 PEELAWAY

## (undated) DEVICE — SOL IRR POUR NS 0.9% 1500ML

## (undated) DEVICE — VENODYNE/SCD SLEEVE CALF MEDIUM

## (undated) DEVICE — SOL IRR BAG NS 0.9% 3000ML

## (undated) DEVICE — SUT QUILL MONODERM 3-0 30CM 19MM

## (undated) DEVICE — SUT VICRYL 1 36" CTX UNDYED